# Patient Record
Sex: FEMALE | Race: AMERICAN INDIAN OR ALASKA NATIVE
[De-identification: names, ages, dates, MRNs, and addresses within clinical notes are randomized per-mention and may not be internally consistent; named-entity substitution may affect disease eponyms.]

---

## 2018-03-24 ENCOUNTER — HOSPITAL ENCOUNTER (INPATIENT)
Dept: HOSPITAL 5 - ED | Age: 33
LOS: 6 days | Discharge: HOME | DRG: 781 | End: 2018-03-30
Attending: OBSTETRICS & GYNECOLOGY | Admitting: OBSTETRICS & GYNECOLOGY
Payer: MEDICAID

## 2018-03-24 DIAGNOSIS — Z3A.01: ICD-10-CM

## 2018-03-24 DIAGNOSIS — O34.11: ICD-10-CM

## 2018-03-24 DIAGNOSIS — D25.9: ICD-10-CM

## 2018-03-24 DIAGNOSIS — O21.1: Primary | ICD-10-CM

## 2018-03-24 LAB
ALBUMIN SERPL-MCNC: 4.5 G/DL (ref 3.9–5)
ALT SERPL-CCNC: 17 UNITS/L (ref 7–56)
BACTERIA #/AREA URNS HPF: (no result) /HPF
BASOPHILS # (AUTO): 0 K/MM3 (ref 0–0.1)
BASOPHILS NFR BLD AUTO: 0.3 % (ref 0–1.8)
BILIRUB UR QL STRIP: (no result)
BLOOD UR QL VISUAL: (no result)
BUN SERPL-MCNC: 11 MG/DL (ref 7–17)
BUN/CREAT SERPL: 18 %
CALCIUM SERPL-MCNC: 9.6 MG/DL (ref 8.4–10.2)
EOSINOPHIL # BLD AUTO: 0 K/MM3 (ref 0–0.4)
EOSINOPHIL NFR BLD AUTO: 0.1 % (ref 0–4.3)
HCT VFR BLD CALC: 47.6 % (ref 30.3–42.9)
HEMOLYSIS INDEX: 2
HGB BLD-MCNC: 15.6 GM/DL (ref 10.1–14.3)
LIPASE SERPL-CCNC: 47 UNITS/L (ref 13–60)
LYMPHOCYTES # BLD AUTO: 2.7 K/MM3 (ref 1.2–5.4)
LYMPHOCYTES NFR BLD AUTO: 21.8 % (ref 13.4–35)
MCH RBC QN AUTO: 27 PG (ref 28–32)
MCHC RBC AUTO-ENTMCNC: 33 % (ref 30–34)
MCV RBC AUTO: 81 FL (ref 79–97)
MONOCYTES # (AUTO): 1.2 K/MM3 (ref 0–0.8)
MONOCYTES % (AUTO): 9.9 % (ref 0–7.3)
MUCOUS THREADS #/AREA URNS HPF: (no result) /HPF
PH UR STRIP: 6 [PH] (ref 5–7)
PLATELET # BLD: 363 K/MM3 (ref 140–440)
RBC # BLD AUTO: 5.89 M/MM3 (ref 3.65–5.03)
RBC #/AREA URNS HPF: 7 /HPF (ref 0–6)
UROBILINOGEN UR-MCNC: < 2 MG/DL (ref ?–2)
WBC #/AREA URNS HPF: 5 /HPF (ref 0–6)

## 2018-03-24 PROCEDURE — 80074 ACUTE HEPATITIS PANEL: CPT

## 2018-03-24 PROCEDURE — 96365 THER/PROPH/DIAG IV INF INIT: CPT

## 2018-03-24 PROCEDURE — 80048 BASIC METABOLIC PNL TOTAL CA: CPT

## 2018-03-24 PROCEDURE — 82150 ASSAY OF AMYLASE: CPT

## 2018-03-24 PROCEDURE — 76817 TRANSVAGINAL US OBSTETRIC: CPT

## 2018-03-24 PROCEDURE — 83735 ASSAY OF MAGNESIUM: CPT

## 2018-03-24 PROCEDURE — 36415 COLL VENOUS BLD VENIPUNCTURE: CPT

## 2018-03-24 PROCEDURE — 83690 ASSAY OF LIPASE: CPT

## 2018-03-24 PROCEDURE — 84443 ASSAY THYROID STIM HORMONE: CPT

## 2018-03-24 PROCEDURE — 96375 TX/PRO/DX INJ NEW DRUG ADDON: CPT

## 2018-03-24 PROCEDURE — 82010 KETONE BODYS QUAN: CPT

## 2018-03-24 PROCEDURE — 84703 CHORIONIC GONADOTROPIN ASSAY: CPT

## 2018-03-24 PROCEDURE — 84132 ASSAY OF SERUM POTASSIUM: CPT

## 2018-03-24 PROCEDURE — 84439 ASSAY OF FREE THYROXINE: CPT

## 2018-03-24 PROCEDURE — 84481 FREE ASSAY (FT-3): CPT

## 2018-03-24 PROCEDURE — 80053 COMPREHEN METABOLIC PANEL: CPT

## 2018-03-24 PROCEDURE — 85025 COMPLETE CBC W/AUTO DIFF WBC: CPT

## 2018-03-24 PROCEDURE — 96376 TX/PRO/DX INJ SAME DRUG ADON: CPT

## 2018-03-24 PROCEDURE — 81001 URINALYSIS AUTO W/SCOPE: CPT

## 2018-03-24 PROCEDURE — 76801 OB US < 14 WKS SINGLE FETUS: CPT

## 2018-03-24 RX ADMIN — DEXTROSE, SODIUM CHLORIDE, SODIUM LACTATE, POTASSIUM CHLORIDE, AND CALCIUM CHLORIDE SCH MLS/HR: 5; .6; .31; .03; .02 INJECTION, SOLUTION INTRAVENOUS at 21:46

## 2018-03-24 RX ADMIN — METOCLOPRAMIDE SCH MG: 5 INJECTION, SOLUTION INTRAMUSCULAR; INTRAVENOUS at 21:46

## 2018-03-24 RX ADMIN — PROMETHAZINE HYDROCHLORIDE SCH MG: 25 SUPPOSITORY RECTAL at 21:47

## 2018-03-24 NOTE — EMERGENCY DEPARTMENT REPORT
HPI





- General


Chief Complaint: Abdominal Pain


Time Seen by Provider: 18 15:05





- HPI


HPI: 


32-year-old  female presents to the emergency department with a 

complaint of nausea, vomiting and dehydration while pregnant.  The patient 

believes herself to be about 10 weeks pregnant.  She says that the symptoms of 

an going on for the past 2 weeks.  She says that she has had a history of this 

one time in the past with her first child.  With this pregnancy she is  

with 2 previous miscarriages.  Her OB/GYN is Dr. Daquan Haynes but she has not seen 

them regarding the symptoms.  No recent travel or sick contacts at home.  She 

says she is unable to keep down any liquids or solids and she feels like it 

gets stuck in the middle of her chest. No vaginal bleeding, dysuria. 








ED Past Medical Hx





- Past Medical History


Previous Medical History?: Yes


Additional medical history: fibroids on ovary





- Surgical History


Past Surgical History?: No





- Social History


Smoking Status: Never Smoker


Substance Use Type: None





ED Review of Systems


ROS: 


Stated complaint: ABDOMINAL PAIN


Other details as noted in HPI





Comment: All other systems reviewed and negative


Constitutional: denies: chills, fever


Eyes: denies: eye pain, eye discharge, vision change


ENT: denies: ear pain, throat pain


Respiratory: denies: cough, shortness of breath, wheezing


Cardiovascular: denies: palpitations, edema


Gastrointestinal: abdominal pain, nausea, vomiting


Genitourinary: denies: urgency, dysuria, discharge


Musculoskeletal: denies: back pain, joint swelling, arthralgia


Skin: denies: rash, lesions


Neurological: denies: headache, weakness, paresthesias





Physical Exam





- Physical Exam


Vital Signs: 


 Vital Signs











  18





  12:56


 


Temperature 99.3 F


 


Pulse Rate 105 H


 


Respiratory 18





Rate 


 


Blood Pressure 128/84


 


O2 Sat by Pulse 96





Oximetry 











Physical Exam: 





GENERAL: The patient is well-developed well-nourished.


HENT: Normocephalic.  Atraumatic.    Patient has moist mucous membranes.


EYES: Extraocular motions are intact.  Pupils equal reactive to light 

bilaterally.


NECK: Supple.  Trachea is midline.


CHEST/LUNGS: Clear to auscultation.  There is no respiratory distress noted.


HEART/CARDIOVASCULAR: Regular.  There is mild tachycardia.  There is no murmur.


ABDOMEN: Abdomen is soft, nontender.  Patient has normal bowel sounds.  There 

is no abdominal distention.


SKIN: Skin is warm and dry.


NEURO: The patient is awake, alert, and oriented.  The patient is cooperative.  

The patient has no focal neurologic deficits.  The patient has normal speech.


MUSCULOSKELETAL: There is no tenderness or deformity.  There is no limitation 

range of motion.  There is no evidence of acute injury.





ED Course


 Vital Signs











  18





  12:56


 


Temperature 99.3 F


 


Pulse Rate 105 H


 


Respiratory 18





Rate 


 


Blood Pressure 128/84


 


O2 Sat by Pulse 96





Oximetry 














ED Medical Decision Making





- Lab Data


Result diagrams: 


 18 13:04





 18 13:04





- Radiology Data


Radiology results: report reviewed





EXAM: US OB TRANSVAGINAL 





HISTORY: abd pain in pregnancy 





TECHNIQUE: Transabdominal OB ultrasound. 





PRIORS: None currently available. 





FINDINGS: 


Single intrauterine pregnancy dates 7.3 weeks by crown rump 


length and mean sac diameter. DELORIS equals 2018. 





Mean sac diameter measures 23.9 mm. Crown-rump length measures 


11.1 mm. 





Uterus: 8.4 x 5.4 x 7.3 cm. Left uterine fibroid measures 1.8 cm. 


Gestational sac, yolk sac, and fetal pole identified. No 


subchorionic bleed. Fetal heart tones equals 152 beats per 


minute. 





Right ovary: 2.1 x 2.1 x 2.7 cm. Within normal limits. Flow is 


present. 





Left Ovary: 2.5 x 1.4 x 1.9 cm. Within normal limits. Flow is 


present. 





Adnexal: Unremarkable. 





No free fluid. 





IMPRESSION: 


Single live intrauterine pregnancy. 











Transcribed By: TYM 


Dictated By: FARHAN LO MD 


Electronically Authenticated By: FARHAN LO MD 


Signed Date/Time: 18 1612 





- Medical Decision Making


Patient has been dealing with about 2 weeks of nausea, vomiting and dehydration 

while pregnant.  Labs show hypokalemia with potassium of 2.8, some 

hypochloremia and hyponatremia.  She got 2 different doses of Zofran, 2 L of IV 

fluid and continues to have vomiting.  This appears consistent with hyperemesis 

gravidarum.  Ultrasound shows live intrauterine pregnancy at about 7.5 weeks.  

I spoke with the patient's OB/GYN, Dr. Haynes, who graciously accepted the 

patient to his service and allowed for me to write some bridging orders.








- Differential Diagnosis


hyperemesis, pregnancy, food poisoning


Critical Care Time: No


Critical care attestation.: 


If time is entered above; I have spent that time in minutes in the direct care 

of this critically ill patient, excluding procedure time.








ED Disposition


Clinical Impression: 


 Hyperemesis gravidarum, Hypokalemia, Hyponatremia, Dehydration





Disposition:  OP ADMIT IP TO THIS HOSP


Is pt being admited?: Yes


Condition: Fair


Instructions:  Abdominal Pain (ED)


Referrals: 


BALA HAYNES MD [Primary Care Provider] - 3-5 Days


Time of Disposition: 18:42

## 2018-03-24 NOTE — ULTRASOUND REPORT
FINAL REPORT



EXAM:  US OB TRANSVAGINAL



HISTORY:  abd pain in pregnancy 



TECHNIQUE:  Transabdominal OB ultrasound.



PRIORS:  None currently available.



FINDINGS:  

Single intrauterine pregnancy dates 7.3 weeks by crown rump

length and mean sac diameter. DELORIS equals November 7, 2018. 



Mean sac diameter measures 23.9 mm. Crown-rump length measures

11.1 mm. 



Uterus: 8.4 x 5.4 x 7.3 cm. Left uterine fibroid measures 1.8 cm.

Gestational sac, yolk sac, and fetal pole identified. No

subchorionic bleed. Fetal heart tones equals 152 beats per

minute. 



Right ovary: 2.1 x 2.1 x 2.7 cm. Within normal limits. Flow is

present. 



Left Ovary: 2.5 x 1.4 x 1.9 cm. Within normal limits. Flow is

present. 



Adnexal: Unremarkable.



No free fluid.



IMPRESSION:  

Single live intrauterine pregnancy.

## 2018-03-24 NOTE — SHORT STAY SUMMARY
Short Stay Documentation


Date of service: 18


Narrative H&P: 





Pt is a 33yo BF  LMP EDC 18; EGA 7 3/7 weeks by u/s presents to the 

emergency department with a complaint of nausea, vomiting and dehydration while 

pregnant.  She says that the symptoms of an going on for the past 2 weeks.  She 

says that she has had a history of this one time in the past with her first 

child.  Her OB/GYN is Dr. Daquan Haynes but she has not seen them regarding the 

symptoms.  No recent travel or sick contacts at home.  She says she is unable 

to keep down any liquids or solids and she feels like it gets stuck in the 

middle of her chest. No vaginal bleeding, dysuria. 








- History


Principal diagnosis: Hyperemesis


H&P: obtained from office


Past Medical History: No medical history


Past Surgical History: No surgical history


Social history: no significant social history, single





- Allergies and Medications


Current Medications: 


 Allergies





No Known Allergies Allergy (Verified 18 12:56)


 





Active Medications





Sodium Chloride (Nacl 0.9% 1000 Ml)  1,000 mls @ 150 mls/hr IV AS DIRECT JOHN











- Physical exam


General appearance: no acute distress


Lungs: Clear to auscultation


Breasts: deferred


Heart: Regular rate


Gastrointestinal: normal


Female Genitourinary: deferred


Extremities: no ischemia


Neurological: Normal gait, Normal speech





- Hospital course


Hospital course: 





Over the course of 6 days, pt received IV hydration, and IV antiemetics and her 

nausea and vomiting resolved.  Her hypokalemia was also corrected, and now she 

is  tolerating a reg diet without nausea or vomiting and ready to go home.





- Disposition


Condition at discharge: Good


Disposition: DC-01 TO HOME OR SELFCARE





- Discharge Diagnoses


(1) 8 weeks gestation of pregnancy


Status: Acute   





(2) Dehydration


Status: Resolved   





(3) Hyperemesis gravidarum


Status: Resolved   





(4) Hypokalemia


Status: Resolved   





Short Stay Discharge Plan


Activity: no restrictions


Diet: regular


Follow up with: 


BALA HAYNES MD [Primary Care Provider] - 7 Days


DAE LOPEZ CNM [Advanced Practice Nurse] - 7 Days


Prescriptions: 


Ondansetron [Zofran ODT TAB] 4 mg PO Q4H PRN #30 tab.rapdis


 PRN Reason: Nausea And Vomiting

## 2018-03-24 NOTE — ULTRASOUND REPORT
FINAL REPORT



EXAM:  US OB &lt; = 14 WEEKS FETUS



HISTORY:  abd pain in pregnancy 



TECHNIQUE:  Ultrasound obstetrical transabdominal 



PRIORS:  Correlated with today's transvaginal exam



FINDINGS:  

Single live intrauterine gestation present crown-rump length 1.1

centimeters corresponding to estimated gestational age 7 weeks 2

days with estimated date of delivery November 7, 2018 



Ovaries demonstrate normal size and echogenicity 



Note is made of 1.8 x 1.4 x 1.5 centimeter intramural hypodense

focus within the uterus consistent with a fibroid 



IMPRESSION:  

Single live intrauterine gestation estimated at 7 weeks 2 days 



1.8 centimeter uterine fibroid noted

## 2018-03-25 LAB
BUN SERPL-MCNC: 7 MG/DL (ref 7–17)
BUN/CREAT SERPL: 12 %
CALCIUM SERPL-MCNC: 8.3 MG/DL (ref 8.4–10.2)
HEMOLYSIS INDEX: 0

## 2018-03-25 RX ADMIN — ONDANSETRON PRN MG: 2 INJECTION INTRAMUSCULAR; INTRAVENOUS at 17:10

## 2018-03-25 RX ADMIN — METOCLOPRAMIDE SCH MG: 5 INJECTION, SOLUTION INTRAMUSCULAR; INTRAVENOUS at 11:25

## 2018-03-25 RX ADMIN — SODIUM CHLORIDE SCH MLS/HR: 0.9 INJECTION, SOLUTION INTRAVENOUS at 22:18

## 2018-03-25 RX ADMIN — METOCLOPRAMIDE SCH MG: 5 INJECTION, SOLUTION INTRAMUSCULAR; INTRAVENOUS at 04:26

## 2018-03-25 RX ADMIN — POTASSIUM CHLORIDE SCH MLS/HR: 10 INJECTION, SOLUTION INTRAVENOUS at 15:09

## 2018-03-25 RX ADMIN — DEXTROSE, SODIUM CHLORIDE, SODIUM LACTATE, POTASSIUM CHLORIDE, AND CALCIUM CHLORIDE SCH MLS/HR: 5; .6; .31; .03; .02 INJECTION, SOLUTION INTRAVENOUS at 00:45

## 2018-03-25 RX ADMIN — PROMETHAZINE HYDROCHLORIDE SCH MG: 25 SUPPOSITORY RECTAL at 17:09

## 2018-03-25 RX ADMIN — POTASSIUM CHLORIDE SCH MLS/HR: 10 INJECTION, SOLUTION INTRAVENOUS at 22:18

## 2018-03-25 RX ADMIN — PROMETHAZINE HYDROCHLORIDE SCH MG: 25 SUPPOSITORY RECTAL at 11:19

## 2018-03-25 RX ADMIN — ONDANSETRON PRN MG: 2 INJECTION INTRAMUSCULAR; INTRAVENOUS at 11:24

## 2018-03-25 RX ADMIN — METOCLOPRAMIDE SCH MG: 5 INJECTION, SOLUTION INTRAMUSCULAR; INTRAVENOUS at 17:10

## 2018-03-25 RX ADMIN — PROMETHAZINE HYDROCHLORIDE SCH MG: 25 SUPPOSITORY RECTAL at 04:26

## 2018-03-25 RX ADMIN — Medication SCH EACH: at 11:20

## 2018-03-25 NOTE — PROGRESS NOTE
Assessment and Plan





- Patient Problems


(1) 7 weeks gestation of pregnancy


Onset Date: 03/25/18   Current Visit: Yes   Status: Acute   


Plan to address problem: 


A:  IUP @ 7 4/7 weeks


      Hyperemesis gravidarum


      Hypokalemia





 P:  Continue with IV hydration and antiemetics


      Replete K+








(2) Dehydration


Onset Date: 03/25/18   Current Visit: Yes   Status: Acute   





(3) Hyperemesis gravidarum


Onset Date: 03/25/18   Current Visit: Yes   Status: Acute   





(4) Hypokalemia


Onset Date: 03/25/18   Current Visit: Yes   Status: Acute   





Subjective





- Subjective


Date of service: 03/25/18


Principal diagnosis: IUP @ 7 4/7 weeks; Hyperemesis


Interval history: 





Pt states she is still nauseated, but not vomiting.


Patient reports: no new complaints, no loss of fluid, no vaginal bleeding





Objective





- Vital Signs


Vital Signs: 


 Vital Signs - 12hr











  03/25/18 03/25/18 03/25/18





  00:00 04:00 08:44


 


Temperature 98.4 F 98.4 F 98.6 F


 


Pulse Rate 97 H 96 H 80


 


Respiratory 20 20 18





Rate   


 


Blood Pressure 108/64 117/76 118/67





[Left]   


 


O2 Sat by Pulse   98





Oximetry   














- Exam


Abdomen: Present: normal appearance, soft





- Labs


Labs: 


 Abnormal Labs











  03/24/18 03/24/18 03/24/18





  13:04 13:04 15:15


 


WBC  12.2 H  


 


RBC  5.89 H  


 


Hgb  15.6 H  


 


Hct  47.6 H  


 


MCH  27 L  


 


Mono % (Auto)  9.9 H  


 


Weld #  1.2 H  


 


Seg Neutrophils #  8.3 H  


 


Sodium   130 L 


 


Potassium   2.8 L* 


 


Chloride   84.1 L 


 


Creatinine   0.6 L 


 


Glucose   114 H 


 


Total Protein   8.3 H 


 


TSH   


 


U Epithel Cells (Auto)    25.0 H














  03/24/18





  22:36


 


WBC 


 


RBC 


 


Hgb 


 


Hct 


 


MCH 


 


Mono % (Auto) 


 


Mono # 


 


Seg Neutrophils # 


 


Sodium 


 


Potassium 


 


Chloride 


 


Creatinine 


 


Glucose 


 


Total Protein 


 


TSH  0.190 L


 


U Epithel Cells (Auto) 








 Laboratory Results - last 24 hr











  03/24/18 03/24/18 03/24/18





  13:04 13:04 13:04


 


WBC  12.2 H  


 


RBC  5.89 H  


 


Hgb  15.6 H  


 


Hct  47.6 H  


 


MCV  81  


 


MCH  27 L  


 


MCHC  33  


 


RDW  13.7  


 


Plt Count  363  


 


Lymph % (Auto)  21.8  


 


Mono % (Auto)  9.9 H  


 


Eos % (Auto)  0.1  


 


Baso % (Auto)  0.3  


 


Lymph #  2.7  


 


Mono #  1.2 H  


 


Eos #  0.0  


 


Baso #  0.0  


 


Seg Neutrophils %  67.9  


 


Seg Neutrophils #  8.3 H  


 


Sodium   130 L 


 


Potassium   2.8 L* 


 


Chloride   84.1 L 


 


Carbon Dioxide   24 


 


Anion Gap   25 


 


BUN   11 


 


Creatinine   0.6 L 


 


Estimated GFR   > 60 


 


BUN/Creatinine Ratio   18 


 


Glucose   114 H 


 


Calcium   9.6 


 


Magnesium   


 


Total Bilirubin   0.80 


 


AST   19 


 


ALT   17 


 


Alkaline Phosphatase   104 


 


Total Protein   8.3 H 


 


Albumin   4.5 


 


Albumin/Globulin Ratio   1.2 


 


Amylase   


 


Lipase   47 


 


TSH   


 


HCG, Qual    Positive


 


Urine Color   


 


Urine Turbidity   


 


Urine pH   


 


Ur Specific Gravity   


 


Urine Protein   


 


Urine Glucose (UA)   


 


Urine Ketones   


 


Urine Blood   


 


Urine Nitrite   


 


Urine Bilirubin   


 


Urine Urobilinogen   


 


Ur Leukocyte Esterase   


 


Urine WBC (Auto)   


 


Urine RBC (Auto)   


 


U Epithel Cells (Auto)   


 


Urine Bacteria (Auto)   


 


Urine Mucus   


 


Hepatitis A IgM Ab   


 


Hep Bs Antigen   


 


Hep B Core IgM Ab   


 


Hepatitis C Antibody   














  03/24/18 03/24/18 03/24/18





  13:04 15:15 22:36


 


WBC   


 


RBC   


 


Hgb   


 


Hct   


 


MCV   


 


MCH   


 


MCHC   


 


RDW   


 


Plt Count   


 


Lymph % (Auto)   


 


Mono % (Auto)   


 


Eos % (Auto)   


 


Baso % (Auto)   


 


Lymph #   


 


Mono #   


 


Eos #   


 


Baso #   


 


Seg Neutrophils %   


 


Seg Neutrophils #   


 


Sodium   


 


Potassium   


 


Chloride   


 


Carbon Dioxide   


 


Anion Gap   


 


BUN   


 


Creatinine   


 


Estimated GFR   


 


BUN/Creatinine Ratio   


 


Glucose   


 


Calcium   


 


Magnesium  2.20  


 


Total Bilirubin   


 


AST   


 


ALT   


 


Alkaline Phosphatase   


 


Total Protein   


 


Albumin   


 


Albumin/Globulin Ratio   


 


Amylase    117


 


Lipase   


 


TSH   


 


HCG, Qual   


 


Urine Color   Yellow 


 


Urine Turbidity   Clear 


 


Urine pH   6.0 


 


Ur Specific Gravity   1.028 


 


Urine Protein   100 mg/dl 


 


Urine Glucose (UA)   50 


 


Urine Ketones   80 


 


Urine Blood   Neg 


 


Urine Nitrite   Neg 


 


Urine Bilirubin   Neg 


 


Urine Urobilinogen   < 2.0 


 


Ur Leukocyte Esterase   Tr 


 


Urine WBC (Auto)   5.0 


 


Urine RBC (Auto)   7.0 


 


U Epithel Cells (Auto)   25.0 H 


 


Urine Bacteria (Auto)   1+ 


 


Urine Mucus   3+ 


 


Hepatitis A IgM Ab   


 


Hep Bs Antigen   


 


Hep B Core IgM Ab   


 


Hepatitis C Antibody   














  03/24/18 03/24/18 03/25/18





  22:36 22:36 05:02


 


WBC   


 


RBC   


 


Hgb   


 


Hct   


 


MCV   


 


MCH   


 


MCHC   


 


RDW   


 


Plt Count   


 


Lymph % (Auto)   


 


Mono % (Auto)   


 


Eos % (Auto)   


 


Baso % (Auto)   


 


Lymph #   


 


Mono #   


 


Eos #   


 


Baso #   


 


Seg Neutrophils %   


 


Seg Neutrophils #   


 


Sodium   


 


Potassium   


 


Chloride   


 


Carbon Dioxide   


 


Anion Gap   


 


BUN   


 


Creatinine   


 


Estimated GFR   


 


BUN/Creatinine Ratio   


 


Glucose   


 


Calcium   


 


Magnesium   


 


Total Bilirubin   


 


AST   


 


ALT   


 


Alkaline Phosphatase   


 


Total Protein   


 


Albumin   


 


Albumin/Globulin Ratio   


 


Amylase   


 


Lipase   


 


TSH  0.190 L  


 


HCG, Qual   


 


Urine Color   


 


Urine Turbidity   


 


Urine pH   


 


Ur Specific Gravity   


 


Urine Protein   


 


Urine Glucose (UA)   


 


Urine Ketones    20


 


Urine Blood   


 


Urine Nitrite   


 


Urine Bilirubin   


 


Urine Urobilinogen   


 


Ur Leukocyte Esterase   


 


Urine WBC (Auto)   


 


Urine RBC (Auto)   


 


U Epithel Cells (Auto)   


 


Urine Bacteria (Auto)   


 


Urine Mucus   


 


Hepatitis A IgM Ab   Non-reactive 


 


Hep Bs Antigen   Non-reactive 


 


Hep B Core IgM Ab   Non-reactive 


 


Hepatitis C Antibody   Non-reactive

## 2018-03-26 RX ADMIN — POTASSIUM CHLORIDE SCH MLS/HR: 10 INJECTION, SOLUTION INTRAVENOUS at 00:01

## 2018-03-26 RX ADMIN — POTASSIUM CHLORIDE PRN MLS/HR: 10 INJECTION, SOLUTION INTRAVENOUS at 10:30

## 2018-03-26 RX ADMIN — METOCLOPRAMIDE SCH MG: 5 INJECTION, SOLUTION INTRAMUSCULAR; INTRAVENOUS at 18:06

## 2018-03-26 RX ADMIN — METOCLOPRAMIDE SCH MG: 5 INJECTION, SOLUTION INTRAMUSCULAR; INTRAVENOUS at 12:09

## 2018-03-26 RX ADMIN — METOCLOPRAMIDE SCH MG: 5 INJECTION, SOLUTION INTRAMUSCULAR; INTRAVENOUS at 00:03

## 2018-03-26 RX ADMIN — POTASSIUM CHLORIDE SCH MLS/HR: 10 INJECTION, SOLUTION INTRAVENOUS at 06:17

## 2018-03-26 RX ADMIN — PROMETHAZINE HYDROCHLORIDE SCH: 25 SUPPOSITORY RECTAL at 22:00

## 2018-03-26 RX ADMIN — POTASSIUM CHLORIDE SCH MLS/HR: 10 INJECTION, SOLUTION INTRAVENOUS at 06:18

## 2018-03-26 RX ADMIN — PROMETHAZINE HYDROCHLORIDE SCH MG: 25 SUPPOSITORY RECTAL at 00:02

## 2018-03-26 RX ADMIN — SODIUM CHLORIDE SCH MLS/HR: 0.9 INJECTION, SOLUTION INTRAVENOUS at 06:18

## 2018-03-26 RX ADMIN — PROMETHAZINE HYDROCHLORIDE SCH: 25 SUPPOSITORY RECTAL at 04:00

## 2018-03-26 RX ADMIN — POTASSIUM CHLORIDE SCH MLS/HR: 10 INJECTION, SOLUTION INTRAVENOUS at 00:02

## 2018-03-26 RX ADMIN — POTASSIUM CHLORIDE PRN MLS/HR: 10 INJECTION, SOLUTION INTRAVENOUS at 21:03

## 2018-03-26 RX ADMIN — POTASSIUM CHLORIDE SCH MLS/HR: 10 INJECTION, SOLUTION INTRAVENOUS at 09:13

## 2018-03-26 RX ADMIN — POTASSIUM CHLORIDE PRN MLS/HR: 10 INJECTION, SOLUTION INTRAVENOUS at 12:07

## 2018-03-26 RX ADMIN — POTASSIUM CHLORIDE PRN MLS/HR: 10 INJECTION, SOLUTION INTRAVENOUS at 22:13

## 2018-03-26 RX ADMIN — POTASSIUM CHLORIDE PRN MLS/HR: 10 INJECTION, SOLUTION INTRAVENOUS at 14:04

## 2018-03-26 RX ADMIN — SODIUM CHLORIDE SCH MLS/HR: 0.9 INJECTION, SOLUTION INTRAVENOUS at 14:03

## 2018-03-26 RX ADMIN — METOCLOPRAMIDE SCH MG: 5 INJECTION, SOLUTION INTRAMUSCULAR; INTRAVENOUS at 06:18

## 2018-03-26 RX ADMIN — Medication SCH: at 12:14

## 2018-03-26 RX ADMIN — SODIUM CHLORIDE SCH MLS/HR: 0.9 INJECTION, SOLUTION INTRAVENOUS at 21:11

## 2018-03-26 RX ADMIN — PROMETHAZINE HYDROCHLORIDE SCH MG: 25 SUPPOSITORY RECTAL at 12:14

## 2018-03-26 RX ADMIN — PROMETHAZINE HYDROCHLORIDE SCH MG: 25 SUPPOSITORY RECTAL at 18:07

## 2018-03-26 NOTE — CONSULTATION
History of Present Illness


Reason for consult: other (Pt is a 33yo BF  LMP EDC 18; EGA 7  

weeks by u/s presents to the emergency department with a complaint of nausea, 

vomiting and dehydration while pregnant.  She says that the symptoms of an 

going on for the past 3 weeks. Patient denies HG with her son .  Her OB/GYN is 

Dr. Daquan Haynes .  No recent travel or sick contacts at home.  She says she is 

unable to keep down any solids and she feels hungry .  Patient does report 

ptyalism  No vaginal bleeding, dysuria, temp.)





Medications and Allergies


 Allergies











Allergy/AdvReac Type Severity Reaction Status Date / Time


 


No Known Allergies Allergy   Verified 18 12:56











 Home Medications











 Medication  Instructions  Recorded  Confirmed  Last Taken  Type


 


No Known Home Medications [No  18 Unknown History





Reported Home Medications]     











Active Meds: 


Active Medications





Sodium Chloride (Nacl 0.9% 1000 Ml)  1,000 mls @ 150 mls/hr IV AS DIRECT [facility]


   Last Admin: 18 06:18 Dose:  150 mls/hr


Potassium Chloride (Kcl 10meq/100ml)  10 meq in 100 mls @ 100 mls/hr IV Q1H PRN


   PRN Reason: Potassium 3-3.5 mEq/L


   Stop: 18 23:59


   Last Admin: 18 10:30 Dose:  100 mls/hr


Metoclopramide HCl (Reglan)  10 mg IV Q6H [facility]


   Last Admin: 18 06:18 Dose:  10 mg


Multivitamins/Iron/Calcium (Prenatal Vitamin)  1 each PO QDAY [facility]


   Last Admin: 18 11:20 Dose:  1 each


Ondansetron HCl (Zofran)  4 mg IV Q6H PRN


   PRN Reason: N/V unrelieved by Reglan


   Last Admin: 18 17:10 Dose:  4 mg


Promethazine HCl (Phenergan)  25 mg MD Q6H [facility]


   Last Admin: 18 04:00 Dose:  Not Given











Review of Systems


Constitutional: weight loss, fatigue, no fever, no chills


Eyes: no blurred vision


Ears, nose, mouth and throat: no dysphagia, no headache


Cardiovascular: no chest pain, no palpitations, no rapid/irregular heart beat, 

no edema, no shortness of breath, no high blood pressure


Respiratory: no shortness of breath


Breasts: deferred


Gastrointestinal: nausea, other (ptyalism ), no abdominal pain, no vomiting, no 

diarrhea


Genitourinary: no vaginal bleeding, no vaginal discharge, no leakage of fluid, 

no pelvic pain


Integumentary: no rash


Neurological: no headaches


Psychiatric: no anxiety, no depression


Allergic/Immunologic: no wheezing





- Vital Signs


Vital signs: 


 Vital Signs











Temp Pulse Resp BP Pulse Ox


 


 99.3 F   105 H  18   128/84   96 


 


 18 12:56  18 12:56  18 12:56  18 12:56  18 12:56








 











Temp Pulse Resp BP Pulse Ox


 


 98.5 F   82   18   113/69   96 


 


 18 08:21  18 08:21  18 08:21  18 08:21  18 08:21














- Physical Exam


Breasts: Positive: deferred


Cardiovascular: Regular rate


Lungs: Positive: Normal air movement


Abdomen: Negative: tenderness, guarding


Uterus: Negative: tender


Deep Tendon Reflex Grade: Normal +2





- Obstetrical


FHR: other (+FHT per [facility] US 3/24/18)





Results


Result Diagrams: 


 18 13:04





 18 05:53


 Abnormal lab results











  18 Range/Units





  12:38 05:53 


 


Sodium  136 L   (137-145)  mmol/L


 


Potassium  2.5 L*  3.1 L D  (3.6-5.0)  mmol/L


 


Creatinine  0.6 L   (0.7-1.2)  mg/dL


 


Calcium  8.3 L   (8.4-10.2)  mg/dL








All other labs normal.





Ultrasound: report reviewed ( 3/24/18 [facility] OB US TV 7.3 weeks CR length 

measured 11.1 mm + FHT of 152 bpm Uterine fibroid )





Assessment and Plan





A) 





SIUP @ 7.5 weeks 


Hyperemisis Gravidarum times 3 weeks improved per patient  


Under medical regimen of IVF , Zofran , Reglan , and Phenergan 


Tolerating clear diet now desires to eat food


Hyokalemia currently under K+ replacement 


Small uterine fibroid noted 


Low TSH 








P) 





In agreement with inpatient management 


Obtain TSH, T#, and T4, Free 


Monitor ketones and electrolytes closely 


Document Amylase,lipase, and liver function assessment 


Patient desires to eat - Advance to soft diet as tolerated 


Khris K+ replacement until hypokalemia resolves 


With any concerns or questions call on call MD provider - Dr. Rudolph

## 2018-03-26 NOTE — PROGRESS NOTE
Assessment and Plan





- Patient Problems


(1) 7 weeks gestation of pregnancy


Onset Date: 03/25/18   Current Visit: Yes   Status: Acute   


Plan to address problem: 


A:  IUP @ 7 5/7 weeks


      Hyperemesis gravidarum - improving


      Hypokalemia - improving





 P:  Continue with IV hydration and antiemetics


      Replete K+


      Obtain [facility] consultation








(2) Dehydration


Onset Date: 03/25/18   Current Visit: Yes   Status: Acute   





(3) Hyperemesis gravidarum


Onset Date: 03/25/18   Current Visit: Yes   Status: Acute   





(4) Hypokalemia


Onset Date: 03/25/18   Current Visit: Yes   Status: Acute   





Subjective





- Subjective


Date of service: 03/26/18


Principal diagnosis: IUP @ 7 5/7 weeks; Hyperemesis


Interval history: 





Pt states she is still nauseated, but not vomiting. She states she is feeling 

better.


Patient reports: no new complaints, no loss of fluid, no vaginal bleeding





Objective





- Vital Signs


Vital Signs: 


 Vital Signs - 12hr











  03/25/18 03/26/18 03/26/18





  23:30 04:00 08:21


 


Temperature 98.6 F 98.7 F 98.5 F


 


Pulse Rate 66 77 82


 


Respiratory 16 16 18





Rate   


 


Blood Pressure   113/69


 


Blood Pressure 102/76 114/76 





[Right]   


 


O2 Sat by Pulse   96





Oximetry   














- Exam


Cardiovascular: Regular rate


Lungs: Clear to auscultation


Abdomen: Present: normal appearance





- Labs


Labs: 


 Abnormal Labs











  03/24/18 03/24/18 03/24/18





  13:04 13:04 15:15


 


WBC  12.2 H  


 


RBC  5.89 H  


 


Hgb  15.6 H  


 


Hct  47.6 H  


 


MCH  27 L  


 


Mono % (Auto)  9.9 H  


 


Dyer #  1.2 H  


 


Seg Neutrophils #  8.3 H  


 


Sodium   130 L 


 


Potassium   2.8 L* 


 


Chloride   84.1 L 


 


Creatinine   0.6 L 


 


Glucose   114 H 


 


Calcium   


 


Total Protein   8.3 H 


 


TSH   


 


U Epithel Cells (Auto)    25.0 H














  03/24/18 03/25/18 03/26/18





  22:36 12:38 05:53


 


WBC   


 


RBC   


 


Hgb   


 


Hct   


 


MCH   


 


Mono % (Auto)   


 


Mono #   


 


Seg Neutrophils #   


 


Sodium   136 L 


 


Potassium   2.5 L*  3.1 L D


 


Chloride   


 


Creatinine   0.6 L 


 


Glucose   


 


Calcium   8.3 L 


 


Total Protein   


 


TSH  0.190 L  


 


U Epithel Cells (Auto)   








 Laboratory Results - last 24 hr











  03/25/18 03/26/18 03/26/18





  12:38 03:10 05:53


 


Sodium  136 L  


 


Potassium  2.5 L*   3.1 L D


 


Chloride  99.0  


 


Carbon Dioxide  27  


 


Anion Gap  13  


 


BUN  7  


 


Creatinine  0.6 L  


 


Estimated GFR  > 60  


 


BUN/Creatinine Ratio  12  


 


Glucose  91  


 


Calcium  8.3 L  


 


Urine Ketones   Neg

## 2018-03-27 RX ADMIN — METOCLOPRAMIDE SCH MG: 5 INJECTION, SOLUTION INTRAMUSCULAR; INTRAVENOUS at 00:22

## 2018-03-27 RX ADMIN — SODIUM CHLORIDE SCH MLS/HR: 0.9 INJECTION, SOLUTION INTRAVENOUS at 07:28

## 2018-03-27 RX ADMIN — POTASSIUM CHLORIDE PRN MLS/HR: 10 INJECTION, SOLUTION INTRAVENOUS at 16:10

## 2018-03-27 RX ADMIN — POTASSIUM CHLORIDE PRN MLS/HR: 10 INJECTION, SOLUTION INTRAVENOUS at 00:23

## 2018-03-27 RX ADMIN — POTASSIUM CHLORIDE PRN MLS/HR: 10 INJECTION, SOLUTION INTRAVENOUS at 20:09

## 2018-03-27 RX ADMIN — POTASSIUM CHLORIDE PRN MLS/HR: 10 INJECTION, SOLUTION INTRAVENOUS at 00:19

## 2018-03-27 RX ADMIN — SODIUM CHLORIDE SCH MLS/HR: 0.9 INJECTION, SOLUTION INTRAVENOUS at 13:59

## 2018-03-27 RX ADMIN — SODIUM CHLORIDE SCH MLS/HR: 0.9 INJECTION, SOLUTION INTRAVENOUS at 20:13

## 2018-03-27 RX ADMIN — METOCLOPRAMIDE SCH: 5 INJECTION, SOLUTION INTRAMUSCULAR; INTRAVENOUS at 04:00

## 2018-03-27 RX ADMIN — POTASSIUM CHLORIDE PRN MLS/HR: 10 INJECTION, SOLUTION INTRAVENOUS at 11:30

## 2018-03-27 RX ADMIN — ONDANSETRON PRN MG: 2 INJECTION INTRAMUSCULAR; INTRAVENOUS at 05:27

## 2018-03-27 RX ADMIN — POTASSIUM CHLORIDE PRN MLS/HR: 10 INJECTION, SOLUTION INTRAVENOUS at 13:55

## 2018-03-27 NOTE — PROGRESS NOTE
Assessment and Plan





- Patient Problems


(1) 7 weeks gestation of pregnancy


Onset Date: 03/25/18   Current Visit: Yes   Status: Acute   


Plan to address problem: 


A:  IUP @ 7 6/7 weeks


      Hyperemesis gravidarum - improving


      Hypokalemia - improving





 P:  Continue with IV hydration and antiemetics


      Replete K+


      Appreciate [facility] consultation








(2) Dehydration


Onset Date: 03/25/18   Current Visit: Yes   Status: Acute   





(3) Hyperemesis gravidarum


Onset Date: 03/25/18   Current Visit: Yes   Status: Acute   





(4) Hypokalemia


Onset Date: 03/25/18   Current Visit: Yes   Status: Acute   





Subjective





- Subjective


Date of service: 03/27/18


Principal diagnosis: IUP @ 7 6/7 weeks; Hyperemesis


Interval history: 





Pt states she is still nauseated, tolerating a liquid diet She states she is 

feeling better.


Patient reports: no new complaints, no loss of fluid, no vaginal bleeding





Objective





- Vital Signs


Vital Signs: 


 Vital Signs - 12hr











  03/27/18 03/27/18 03/27/18





  01:03 05:12 07:32


 


Temperature 99.4 F 99.1 F 98.6 F


 


Pulse Rate 102 H 101 H 80


 


Respiratory 22 20 18





Rate   


 


Blood Pressure 113/70  108/64


 


Blood Pressure  114/72 





[Right]   


 


O2 Sat by Pulse 97  99





Oximetry   














- Exam


Abdomen: Present: normal appearance, soft





- Labs


Labs: 


 Abnormal Labs











  03/24/18 03/24/18 03/24/18





  13:04 13:04 15:15


 


WBC  12.2 H  


 


RBC  5.89 H  


 


Hgb  15.6 H  


 


Hct  47.6 H  


 


MCH  27 L  


 


Mono % (Auto)  9.9 H  


 


[place] #  1.2 H  


 


Seg Neutrophils #  8.3 H  


 


Sodium   130 L 


 


Potassium   2.8 L* 


 


Chloride   84.1 L 


 


Creatinine   0.6 L 


 


Glucose   114 H 


 


Calcium   


 


Total Protein   8.3 H 


 


TSH   


 


Free T4   


 


U Epithel Cells (Auto)    25.0 H














  03/24/18 03/25/18 03/26/18





  22:36 12:38 05:53


 


WBC   


 


RBC   


 


Hgb   


 


Hct   


 


MCH   


 


Mono % (Auto)   


 


Mono #   


 


Seg Neutrophils #   


 


Sodium   136 L 


 


Potassium   2.5 L*  3.1 L D


 


Chloride   


 


Creatinine   0.6 L 


 


Glucose   


 


Calcium   8.3 L 


 


Total Protein   


 


TSH  0.190 L  


 


Free T4   


 


U Epithel Cells (Auto)   














  03/26/18 03/27/18 03/27/18





  16:40 05:26 09:53


 


WBC   


 


RBC   


 


Hgb   


 


Hct   


 


MCH   


 


Mono % (Auto)   


 


Mono #   


 


Seg Neutrophils #   


 


Sodium   


 


Potassium  3.1 L  3.2 L 


 


Chloride   


 


Creatinine   


 


Glucose   


 


Calcium   


 


Total Protein   


 


TSH   


 


Free T4    1.67 H


 


U Epithel Cells (Auto)   








 Laboratory Results - last 24 hr











  03/26/18 03/26/18 03/27/18





  16:00 16:40 05:26


 


Potassium   3.1 L  3.2 L


 


Free T4   


 


Urine Ketones  Neg  














  03/27/18





  09:53


 


Potassium 


 


Free T4  1.67 H


 


Urine Ketones

## 2018-03-28 RX ADMIN — SODIUM CHLORIDE SCH MLS/HR: 0.9 INJECTION, SOLUTION INTRAVENOUS at 22:59

## 2018-03-28 RX ADMIN — ONDANSETRON PRN MG: 4 TABLET, ORALLY DISINTEGRATING ORAL at 22:48

## 2018-03-28 RX ADMIN — SODIUM CHLORIDE SCH MLS/HR: 0.9 INJECTION, SOLUTION INTRAVENOUS at 03:57

## 2018-03-28 NOTE — PROGRESS NOTE
Assessment and Plan





- Patient Problems


(1) 7 weeks gestation of pregnancy


Onset Date: 03/25/18   Current Visit: Yes   Status: Acute   


Plan to address problem: 


A:  IUP @ 8 0/7 weeks


      Hyperemesis gravidarum - improving


      Hypokalemia - resolved





 P:  Anticipate discharge to home later today








(2) Dehydration


Onset Date: 03/25/18   Current Visit: Yes   Status: Acute   





(3) Hyperemesis gravidarum


Onset Date: 03/25/18   Current Visit: Yes   Status: Acute   





(4) Hypokalemia


Onset Date: 03/25/18   Current Visit: Yes   Status: Acute   





Subjective





- Subjective


Date of service: 03/28/18


Principal diagnosis: IUP @ 8 0/7 weeks; Hyperemesis


Interval history: 





Pt states she is feeling well, tolerating a reg diet without nausea or 

vomiting. She states she is feeling better.


Patient reports: no new complaints, no loss of fluid, no vaginal bleeding





Objective





- Vital Signs


Vital Signs: 


 Vital Signs - 12hr











  03/28/18 03/28/18





  00:00 04:25


 


Temperature 98.2 F 98.2 F


 


Pulse Rate 83 80


 


Respiratory  20





Rate  


 


Blood Pressure 113/74 108/66





[Right]  














- Exam


Abdomen: Present: normal appearance, soft





- Labs


Labs: 


 Abnormal Labs











  03/24/18 03/24/18 03/24/18





  13:04 13:04 15:15


 


WBC  12.2 H  


 


RBC  5.89 H  


 


Hgb  15.6 H  


 


Hct  47.6 H  


 


MCH  27 L  


 


Mono % (Auto)  9.9 H  


 


[place] #  1.2 H  


 


Seg Neutrophils #  8.3 H  


 


Sodium   130 L 


 


Potassium   2.8 L* 


 


Chloride   84.1 L 


 


Creatinine   0.6 L 


 


Glucose   114 H 


 


Calcium   


 


Total Protein   8.3 H 


 


TSH   


 


Free T4   


 


U Epithel Cells (Auto)    25.0 H














  03/24/18 03/25/18 03/26/18





  22:36 12:38 05:53


 


WBC   


 


RBC   


 


Hgb   


 


Hct   


 


MCH   


 


Mono % (Auto)   


 


Mono #   


 


Seg Neutrophils #   


 


Sodium   136 L 


 


Potassium   2.5 L*  3.1 L D


 


Chloride   


 


Creatinine   0.6 L 


 


Glucose   


 


Calcium   8.3 L 


 


Total Protein   


 


TSH  0.190 L  


 


Free T4   


 


U Epithel Cells (Auto)   














  03/26/18 03/27/18 03/27/18





  16:40 05:26 09:53


 


WBC   


 


RBC   


 


Hgb   


 


Hct   


 


MCH   


 


Mono % (Auto)   


 


Mono #   


 


Seg Neutrophils #   


 


Sodium   


 


Potassium  3.1 L  3.2 L 


 


Chloride   


 


Creatinine   


 


Glucose   


 


Calcium   


 


Total Protein   


 


TSH   


 


Free T4    1.67 H


 


U Epithel Cells (Auto)   








 Laboratory Results - last 24 hr











  03/27/18 03/27/18





  09:53 22:47


 


Potassium   3.9  D


 


Free T4  1.67 H

## 2018-03-29 RX ADMIN — SODIUM CHLORIDE SCH MLS/HR: 0.9 INJECTION, SOLUTION INTRAVENOUS at 05:50

## 2018-03-29 RX ADMIN — ONDANSETRON PRN MG: 4 TABLET, ORALLY DISINTEGRATING ORAL at 15:28

## 2018-03-29 RX ADMIN — ONDANSETRON PRN MG: 4 TABLET, ORALLY DISINTEGRATING ORAL at 21:57

## 2018-03-29 RX ADMIN — ONDANSETRON PRN MG: 4 TABLET, ORALLY DISINTEGRATING ORAL at 04:38

## 2018-03-29 NOTE — PROGRESS NOTE
Assessment and Plan





- Patient Problems


(1) 7 weeks gestation of pregnancy


Onset Date: 03/25/18   Current Visit: Yes   Status: Acute   


Plan to address problem: 


A:  IUP @ 8 1/7 weeks


      Hyperemesis gravidarum - improving


      Hypokalemia - resolved





 P:  Anticipate discharge to home tomorrow.








(2) Dehydration


Onset Date: 03/25/18   Current Visit: Yes   Status: Acute   





(3) Hyperemesis gravidarum


Onset Date: 03/25/18   Current Visit: Yes   Status: Acute   





(4) Hypokalemia


Onset Date: 03/25/18   Current Visit: Yes   Status: Acute   





Subjective





- Subjective


Date of service: 03/29/18


Principal diagnosis: IUP @ 8 1/7 weeks; Hyperemesis


Interval history: 





Pt states she is feeling well, had some vomiting earlier, now resolved and 

tolerating a reg diet without nausea or vomiting. She states she is feeling 

better.


Patient reports: no new complaints, no loss of fluid, no vaginal bleeding





Objective





- Vital Signs


Vital Signs: 


 Vital Signs - 12hr











  03/29/18 03/29/18 03/29/18





  00:00 04:20 07:25


 


Temperature 98.2 F 98.2 F 98.8 F


 


Pulse Rate 77 78 78


 


Respiratory 18 18 18





Rate   


 


Blood Pressure   109/64


 


Blood Pressure 111/62 113/71 





[Right]   


 


O2 Sat by Pulse   98





Oximetry   














- Exam


Cardiovascular: Regular rate


Lungs: Clear to auscultation


Abdomen: Present: normal appearance, soft





- Labs


Labs: 


 Abnormal Labs











  03/24/18 03/24/18 03/24/18





  13:04 13:04 15:15


 


WBC  12.2 H  


 


RBC  5.89 H  


 


Hgb  15.6 H  


 


Hct  47.6 H  


 


MCH  27 L  


 


Mono % (Auto)  9.9 H  


 


[place] #  1.2 H  


 


Seg Neutrophils #  8.3 H  


 


Sodium   130 L 


 


Potassium   2.8 L* 


 


Chloride   84.1 L 


 


Creatinine   0.6 L 


 


Glucose   114 H 


 


Calcium   


 


Total Protein   8.3 H 


 


TSH   


 


Free T4   


 


U Epithel Cells (Auto)    25.0 H














  03/24/18 03/25/18 03/26/18





  22:36 12:38 05:53


 


WBC   


 


RBC   


 


Hgb   


 


Hct   


 


MCH   


 


Mono % (Auto)   


 


Mono #   


 


Seg Neutrophils #   


 


Sodium   136 L 


 


Potassium   2.5 L*  3.1 L D


 


Chloride   


 


Creatinine   0.6 L 


 


Glucose   


 


Calcium   8.3 L 


 


Total Protein   


 


TSH  0.190 L  


 


Free T4   


 


U Epithel Cells (Auto)   














  03/26/18 03/27/18 03/27/18





  16:40 05:26 09:53


 


WBC   


 


RBC   


 


Hgb   


 


Hct   


 


MCH   


 


Mono % (Auto)   


 


Mono #   


 


Seg Neutrophils #   


 


Sodium   


 


Potassium  3.1 L  3.2 L 


 


Chloride   


 


Creatinine   


 


Glucose   


 


Calcium   


 


Total Protein   


 


TSH   


 


Free T4    1.67 H


 


U Epithel Cells (Auto)   








 Laboratory Results - last 24 hr











  03/27/18





  09:53


 


Free T3 Index  2.8

## 2018-03-30 VITALS — DIASTOLIC BLOOD PRESSURE: 66 MMHG | SYSTOLIC BLOOD PRESSURE: 120 MMHG

## 2018-03-30 RX ADMIN — ONDANSETRON PRN MG: 4 TABLET, ORALLY DISINTEGRATING ORAL at 08:25

## 2018-03-30 NOTE — PROGRESS NOTE
Assessment and Plan





- Patient Problems


(1) 7 weeks gestation of pregnancy


Onset Date: 03/25/18   Current Visit: Yes   Status: Acute   


Plan to address problem: 


A:  IUP @ 8 2/7 weeks


      Hyperemesis gravidarum - resolved


      Hypokalemia - resolved





 P:  May go home today.








(2) Dehydration


Onset Date: 03/25/18   Current Visit: Yes   Status: Acute   





(3) Hyperemesis gravidarum


Onset Date: 03/25/18   Current Visit: Yes   Status: Acute   





(4) Hypokalemia


Onset Date: 03/25/18   Current Visit: Yes   Status: Acute   





Subjective





- Subjective


Date of service: 03/30/18


Principal diagnosis: IUP @ 8 2/7 weeks; Hyperemesis


Interval history: 





Pt is feeling well, tolerating a reg diet without nausea or vomiting. She 

states she is ready to go home.


Patient reports: no new complaints, no loss of fluid, no vaginal bleeding





Objective





- Vital Signs


Vital Signs: 


 Vital Signs - 12hr











  03/29/18 03/30/18 03/30/18





  21:15 00:00 04:20


 


Temperature 97.8 F 98.5 F 98.0 F


 


Pulse Rate 78 72 83


 


Respiratory 18 18 18





Rate   


 


Blood Pressure 111/61 116/73 109/58





[Right]   














- Exam


Cardiovascular: Regular rate


Lungs: Clear to auscultation


Abdomen: Present: normal appearance, soft





- Labs


Labs: 


 Abnormal Labs











  03/24/18 03/24/18 03/24/18





  13:04 13:04 15:15


 


WBC  12.2 H  


 


RBC  5.89 H  


 


Hgb  15.6 H  


 


Hct  47.6 H  


 


MCH  27 L  


 


Mono % (Auto)  9.9 H  


 


[place] #  1.2 H  


 


Seg Neutrophils #  8.3 H  


 


Sodium   130 L 


 


Potassium   2.8 L* 


 


Chloride   84.1 L 


 


Creatinine   0.6 L 


 


Glucose   114 H 


 


Calcium   


 


Total Protein   8.3 H 


 


TSH   


 


Free T4   


 


U Epithel Cells (Auto)    25.0 H














  03/24/18 03/25/18 03/26/18





  22:36 12:38 05:53


 


WBC   


 


RBC   


 


Hgb   


 


Hct   


 


MCH   


 


Mono % (Auto)   


 


Mono #   


 


Seg Neutrophils #   


 


Sodium   136 L 


 


Potassium   2.5 L*  3.1 L D


 


Chloride   


 


Creatinine   0.6 L 


 


Glucose   


 


Calcium   8.3 L 


 


Total Protein   


 


TSH  0.190 L  


 


Free T4   


 


U Epithel Cells (Auto)   














  03/26/18 03/27/18 03/27/18





  16:40 05:26 09:53


 


WBC   


 


RBC   


 


Hgb   


 


Hct   


 


MCH   


 


Mono % (Auto)   


 


Mono #   


 


Seg Neutrophils #   


 


Sodium   


 


Potassium  3.1 L  3.2 L 


 


Chloride   


 


Creatinine   


 


Glucose   


 


Calcium   


 


Total Protein   


 


TSH   


 


Free T4    1.67 H


 


U Epithel Cells (Auto)

## 2018-04-03 ENCOUNTER — HOSPITAL ENCOUNTER (EMERGENCY)
Dept: HOSPITAL 5 - ED | Age: 33
Discharge: HOME | End: 2018-04-03
Payer: MEDICAID

## 2018-04-03 VITALS — DIASTOLIC BLOOD PRESSURE: 79 MMHG | SYSTOLIC BLOOD PRESSURE: 110 MMHG

## 2018-04-03 DIAGNOSIS — O23.11: Primary | ICD-10-CM

## 2018-04-03 DIAGNOSIS — Z3A.10: ICD-10-CM

## 2018-04-03 DIAGNOSIS — O21.9: ICD-10-CM

## 2018-04-03 LAB
ALBUMIN SERPL-MCNC: 4.4 G/DL (ref 3.9–5)
ALT SERPL-CCNC: 11 UNITS/L (ref 7–56)
BASOPHILS # (AUTO): 0 K/MM3 (ref 0–0.1)
BASOPHILS NFR BLD AUTO: 0.5 % (ref 0–1.8)
BILIRUB UR QL STRIP: (no result)
BLOOD UR QL VISUAL: (no result)
BUN SERPL-MCNC: 6 MG/DL (ref 7–17)
BUN/CREAT SERPL: 10 %
CALCIUM SERPL-MCNC: 9.6 MG/DL (ref 8.4–10.2)
EOSINOPHIL # BLD AUTO: 0 K/MM3 (ref 0–0.4)
EOSINOPHIL NFR BLD AUTO: 0.3 % (ref 0–4.3)
HCT VFR BLD CALC: 42.4 % (ref 30.3–42.9)
HEMOLYSIS INDEX: 1
HGB BLD-MCNC: 14 GM/DL (ref 10.1–14.3)
LYMPHOCYTES # BLD AUTO: 2.2 K/MM3 (ref 1.2–5.4)
LYMPHOCYTES NFR BLD AUTO: 27.1 % (ref 13.4–35)
MCH RBC QN AUTO: 27 PG (ref 28–32)
MCHC RBC AUTO-ENTMCNC: 33 % (ref 30–34)
MCV RBC AUTO: 81 FL (ref 79–97)
MONOCYTES # (AUTO): 0.7 K/MM3 (ref 0–0.8)
MONOCYTES % (AUTO): 8.5 % (ref 0–7.3)
MUCOUS THREADS #/AREA URNS HPF: (no result) /HPF
PH UR STRIP: 6 [PH] (ref 5–7)
PLATELET # BLD: 362 K/MM3 (ref 140–440)
RBC # BLD AUTO: 5.23 M/MM3 (ref 3.65–5.03)
RBC #/AREA URNS HPF: 4 /HPF (ref 0–6)
UROBILINOGEN UR-MCNC: < 2 MG/DL (ref ?–2)
WBC #/AREA URNS HPF: 5 /HPF (ref 0–6)

## 2018-04-03 PROCEDURE — 84702 CHORIONIC GONADOTROPIN TEST: CPT

## 2018-04-03 PROCEDURE — 85025 COMPLETE CBC W/AUTO DIFF WBC: CPT

## 2018-04-03 PROCEDURE — 83690 ASSAY OF LIPASE: CPT

## 2018-04-03 PROCEDURE — 99283 EMERGENCY DEPT VISIT LOW MDM: CPT

## 2018-04-03 PROCEDURE — 36415 COLL VENOUS BLD VENIPUNCTURE: CPT

## 2018-04-03 PROCEDURE — 96375 TX/PRO/DX INJ NEW DRUG ADDON: CPT

## 2018-04-03 PROCEDURE — 80053 COMPREHEN METABOLIC PANEL: CPT

## 2018-04-03 PROCEDURE — 96365 THER/PROPH/DIAG IV INF INIT: CPT

## 2018-04-03 PROCEDURE — 96361 HYDRATE IV INFUSION ADD-ON: CPT

## 2018-04-03 PROCEDURE — 81001 URINALYSIS AUTO W/SCOPE: CPT

## 2018-04-03 NOTE — EMERGENCY DEPARTMENT REPORT
HPI





- General


Chief Complaint: Nausea/Vomiting/Diarrhea


Time Seen by Provider: 18 17:19





- HPI


HPI: 





Patient is a 32-year-old  who presents to ED at approximately 10 weeks' 

gestation complaining of controllable nausea or vomiting since Saturday.  

Patient states she is unable to hold food down and keep starring up.  Patient 

states she should take Zofran and Zofran does not help with her nausea and 

makes her feel more nauseous.  Patient states that she was seen by her doctor 

yesterday.  She denies fever, pelvic pain, vaginal bleeding or discharge





ED Past Medical Hx





- Past Medical History


Additional medical history: fibroids on ovary





- Social History


Smoking Status: Never Smoker


Substance Use Type: None





- Medications


Home Medications: 


 Home Medications











 Medication  Instructions  Recorded  Confirmed  Last Taken  Type


 


Ondansetron [Zofran ODT TAB] 4 mg PO Q4H PRN #30 tab.samanta 18  Unknown Rx


 


Doxylamine Succinate/Vit B6 2 tab PO QHS #60 tablet. 18  Unknown Rx





[Diclegis Dr 10-10 mg Tablet]     














ED Review of Systems


ROS: 


Stated complaint: ABD PN 10 WKS PREGNANT


Other details as noted in HPI





Constitutional: denies: chills, fever


Eyes: denies: eye pain, eye discharge, vision change


ENT: denies: ear pain, throat pain


Respiratory: denies: cough, shortness of breath, wheezing


Cardiovascular: denies: chest pain, palpitations


Endocrine: no symptoms reported


Gastrointestinal: denies: abdominal pain, nausea, diarrhea


Genitourinary: denies: urgency, dysuria, discharge


Musculoskeletal: denies: back pain, joint swelling, arthralgia


Skin: denies: rash, lesions


Neurological: denies: headache, weakness, paresthesias


Psychiatric: denies: anxiety, depression


Hematological/Lymphatic: denies: easy bleeding, easy bruising





Physical Exam





- Physical Exam


Vital Signs: 


 Vital Signs











  18





  14:12


 


Temperature 97.7 F


 


Pulse Rate 79


 


Respiratory 18





Rate 


 


Blood Pressure 110/79


 


O2 Sat by Pulse 100





Oximetry 











Physical Exam: 





GENERAL: Alert and oriented x3, no apparent distress, Normal Gait, atraumatic.





MOUTH:Mouth is well hydrated and without lesions. Tonsils nonerythematous or 

swollen,  Uvula midline, Tongue not elevated. Mucous membranes are moist. 

Posterior pharynx clear, no exudate or lesions. Patent airways.





LUNGS: Symetrical with respiration, No wheezing, no rales or crackles, CTAB.


HEART:  S1, S2 present, regular rate and rhythm without murmur, no rubs, no 

gallops. Non tender to palpation


ABDOMEN: No organomegaly was noted,Positive bowel sounds, soft, and non-

distended.  . Nontender to palpation on all Quadrants, NO CVA tenderness.


BACK: Full range of motion, no spinal tenderness, nontender to palpation.





SKIN:  Warm and dry, No lesions, No ulceration or induration present.











ED Course


 Vital Signs











  18





  14:12


 


Temperature 97.7 F


 


Pulse Rate 79


 


Respiratory 18





Rate 


 


Blood Pressure 110/79


 


O2 Sat by Pulse 100





Oximetry 














ED Medical Decision Making





- Lab Data


Result diagrams: 


 18 17:33





 18 17:33





 Laboratory Last Values











WBC  8.0 K/mm3 (4.5-11.0)   18  17:33    


 


RBC  5.23 M/mm3 (3.65-5.03)  H  18  17:33    


 


Hgb  14.0 gm/dl (10.1-14.3)   18  17:33    


 


Hct  42.4 % (30.3-42.9)   18  17:33    


 


MCV  81 fl (79-97)   18  17:33    


 


MCH  27 pg (28-32)  L  18  17:33    


 


MCHC  33 % (30-34)   18  17:33    


 


RDW  14.4 % (13.2-15.2)   18  17:33    


 


Plt Count  362 K/mm3 (140-440)   18  17:33    


 


Lymph % (Auto)  27.1 % (13.4-35.0)   18  17:33    


 


Mono % (Auto)  8.5 % (0.0-7.3)  H  18  17:33    


 


Eos % (Auto)  0.3 % (0.0-4.3)   18  17:33    


 


Baso % (Auto)  0.5 % (0.0-1.8)   18  17:33    


 


Lymph #  2.2 K/mm3 (1.2-5.4)   18  17:33    


 


Mono #  0.7 K/mm3 (0.0-0.8)   18  17:33    


 


Eos #  0.0 K/mm3 (0.0-0.4)   18  17:33    


 


Baso #  0.0 K/mm3 (0.0-0.1)   18  17:33    


 


Seg Neutrophils %  63.6 % (40.0-70.0)   18  17:33    


 


Seg Neutrophils #  5.1 K/mm3 (1.8-7.7)   18  17:33    


 


Sodium  137 mmol/L (137-145)   18  17:33    


 


Potassium  3.8 mmol/L (3.6-5.0)   18  17:33    


 


Chloride  98.0 mmol/L ()   18  17:33    


 


Carbon Dioxide  22 mmol/L (22-30)   18  17:33    


 


Anion Gap  21 mmol/L  18  17:33    


 


BUN  6 mg/dL (7-17)  L  18  17:33    


 


Creatinine  0.6 mg/dL (0.7-1.2)  L  18  17:33    


 


Estimated GFR  > 60 ml/min  18  17:33    


 


BUN/Creatinine Ratio  10 %  18  17:33    


 


Glucose  95 mg/dL ()   18  17:33    


 


Calcium  9.6 mg/dL (8.4-10.2)   18  17:33    


 


Total Bilirubin  0.40 mg/dL (0.1-1.2)   18  17:33    


 


AST  15 units/L (5-40)   18  17:33    


 


ALT  11 units/L (7-56)   18  17:33    


 


Alkaline Phosphatase  77 units/L ()   18  17:33    


 


Total Protein  7.7 g/dL (6.3-8.2)   18  17:33    


 


Albumin  4.4 g/dL (3.9-5)   18  17:33    


 


Albumin/Globulin Ratio  1.3 %  18  17:33    


 


Lipase  29 units/L (13-60)   18  17:33    


 


HCG, Quant  214733 mIU/mL (0-4)  H  18  17:33    


 


Urine Color  Yellow  (Yellow)   18  17:32    


 


Urine Turbidity  Clear  (Clear)   18  17:32    


 


Urine pH  6.0  (5.0-7.0)   18  17:32    


 


Ur Specific Gravity  1.023  (1.003-1.030)   18  17:32    


 


Urine Protein  30 mg/dl mg/dL (Negative)   18  17:32    


 


Urine Glucose (UA)  Neg mg/dL (Negative)   18  17:32    


 


Urine Ketones  80 mg/dL (Negative)   18  17:32    


 


Urine Blood  Neg  (Negative)   18  17:32    


 


Urine Nitrite  Neg  (Negative)   18  17:32    


 


Urine Bilirubin  Neg  (Negative)   18  17:32    


 


Urine Urobilinogen  < 2.0 mg/dL (<2.0)   18  17:32    


 


Ur Leukocyte Esterase  Lg  (Negative)   18  17:32    


 


Urine WBC (Auto)  5.0 /HPF (0.0-6.0)   18  17:32    


 


Urine RBC (Auto)  4.0 /HPF (0.0-6.0)   18  17:32    


 


U Epithel Cells (Auto)  16.0 /HPF (0-13.0)  H  18  17:32    


 


Urine Mucus  3+ /HPF  18  17:32    














- Medical Decision Making


32-year-old female presents with nausea vomiting and pregnancy


ED course: Patient received 1 normal saline.  1 L of D5 as well as Zofran and 

Rocephin ED


CBC within normal limit BMP shows low BUN and creatinine, normal quant level, 

mild cystitis according to urinalysis


I discussed the patient I will send her home on a trial of shelli [place] to 

help with her nausea and vomiting.


Discussed the patient to try gracie products gracie teas, gracie powders to put 

it foods and soups to help keep food down.


Patient is no longer vomiting in the ED, she is able to tolerate by mouth with 

juice and crackers prior to discharge


I discussed to follow up with Dr. Luis Haynes as per her regular scheduled 

appointment in 2 weeks


Vital signs are normalized patient is in no acute distress


Show signs of instructions given.





Critical care attestation.: 


If time is entered above; I have spent that time in minutes in the direct care 

of this critically ill patient, excluding procedure time.








ED Disposition


Clinical Impression: 


 Nausea and vomiting during pregnancy, Normal pregnancy in first trimester, 

Cystitis





Disposition:  TO HOME OR SELFCARE


Is pt being admited?: No


Does the pt Need Aspirin: No


Condition: Stable


Instructions:  Morning Sickness (ED), Pregnancy (ED), Hyperemesis Gravidarum (ED

), Urinary Tract Infection in Women (ED)


Additional Instructions: 


Make sure to follow up with the OB/GYN as discussed.


Take all your medications as you've been prescribed.


Keep hydrated daily- 8-10 glasses


If you have any worsening symptoms or develop new symptoms please return to ED 

immediately.


Prescriptions: 


Doxylamine Succinate/Vit B6 [Diclegis Dr 10-10 mg Tablet] 2 tab PO QHS #60 

tablet.


Referrals: 


LUIS HAYNES MD [Primary Care Provider] - 3-5 Days


Forms:  Work/School Release Form(ED)


Time of Disposition: 22:11

## 2018-04-03 NOTE — EMERGENCY DEPARTMENT REPORT
Blank Doc





- Documentation


Documentation: 





Patient is a 32-year-old female who is presenting with nausea vomiting.  

Patient was admitted to the hospital week and a half ago for intractable nausea 

vomiting and hyperemesis gravidarum.  Patient states that she is still unable 

to keep anything down.  Patient states she is dizzy when she stands.


Patient be started on IV fluids check la.  Patient's cellulitis Luis Haynes 

he saw her today and sent her in for probable admission.

## 2018-04-13 ENCOUNTER — HOSPITAL ENCOUNTER (INPATIENT)
Dept: HOSPITAL 5 - 3A | Age: 33
LOS: 4 days | Discharge: HOME | DRG: 781 | End: 2018-04-17
Attending: OBSTETRICS & GYNECOLOGY | Admitting: OBSTETRICS & GYNECOLOGY
Payer: MEDICAID

## 2018-04-13 DIAGNOSIS — O21.1: Primary | ICD-10-CM

## 2018-04-13 DIAGNOSIS — Z79.899: ICD-10-CM

## 2018-04-13 DIAGNOSIS — Z3A.10: ICD-10-CM

## 2018-04-13 LAB
BASOPHILS # (AUTO): 0.1 K/MM3 (ref 0–0.1)
BASOPHILS NFR BLD AUTO: 0.7 % (ref 0–1.8)
BILIRUB UR QL STRIP: (no result)
BLOOD UR QL VISUAL: (no result)
BUN SERPL-MCNC: 7 MG/DL (ref 7–17)
BUN/CREAT SERPL: 18 %
CALCIUM SERPL-MCNC: 9 MG/DL (ref 8.4–10.2)
EOSINOPHIL # BLD AUTO: 0 K/MM3 (ref 0–0.4)
EOSINOPHIL NFR BLD AUTO: 0.5 % (ref 0–4.3)
HCT VFR BLD CALC: 39 % (ref 30.3–42.9)
HEMOLYSIS INDEX: 43
HGB BLD-MCNC: 13.3 GM/DL (ref 10.1–14.3)
LIPASE SERPL-CCNC: 55 UNITS/L (ref 13–60)
LYMPHOCYTES # BLD AUTO: 2.5 K/MM3 (ref 1.2–5.4)
LYMPHOCYTES NFR BLD AUTO: 34 % (ref 13.4–35)
MCH RBC QN AUTO: 28 PG (ref 28–32)
MCHC RBC AUTO-ENTMCNC: 34 % (ref 30–34)
MCV RBC AUTO: 81 FL (ref 79–97)
MONOCYTES # (AUTO): 0.6 K/MM3 (ref 0–0.8)
MONOCYTES % (AUTO): 7.8 % (ref 0–7.3)
MUCOUS THREADS #/AREA URNS HPF: (no result) /HPF
PH UR STRIP: 8 [PH] (ref 5–7)
PLATELET # BLD: 304 K/MM3 (ref 140–440)
RBC # BLD AUTO: 4.84 M/MM3 (ref 3.65–5.03)
RBC #/AREA URNS HPF: 12 /HPF (ref 0–6)
UROBILINOGEN UR-MCNC: 2 MG/DL (ref ?–2)
WBC #/AREA URNS HPF: 3 /HPF (ref 0–6)

## 2018-04-13 PROCEDURE — 85025 COMPLETE CBC W/AUTO DIFF WBC: CPT

## 2018-04-13 PROCEDURE — 81001 URINALYSIS AUTO W/SCOPE: CPT

## 2018-04-13 PROCEDURE — 80048 BASIC METABOLIC PNL TOTAL CA: CPT

## 2018-04-13 PROCEDURE — 84443 ASSAY THYROID STIM HORMONE: CPT

## 2018-04-13 PROCEDURE — 82150 ASSAY OF AMYLASE: CPT

## 2018-04-13 PROCEDURE — 82010 KETONE BODYS QUAN: CPT

## 2018-04-13 PROCEDURE — 83690 ASSAY OF LIPASE: CPT

## 2018-04-13 PROCEDURE — 36415 COLL VENOUS BLD VENIPUNCTURE: CPT

## 2018-04-13 RX ADMIN — METOCLOPRAMIDE SCH MG: 5 INJECTION, SOLUTION INTRAMUSCULAR; INTRAVENOUS at 18:35

## 2018-04-13 RX ADMIN — PROMETHAZINE HYDROCHLORIDE SCH: 25 SUPPOSITORY RECTAL at 22:07

## 2018-04-13 RX ADMIN — DEXTROSE, SODIUM CHLORIDE, SODIUM LACTATE, POTASSIUM CHLORIDE, AND CALCIUM CHLORIDE SCH MLS/HR: 5; .6; .31; .03; .02 INJECTION, SOLUTION INTRAVENOUS at 18:35

## 2018-04-13 NOTE — SHORT STAY SUMMARY
Short Stay Documentation


Date of service: 18


Narrative H&P: 





Pt is a 31yo BF  LMP EDC 18; EGA 10 2/ weeks by u/s presented to the 

office with a complaint of persistent nausea, vomiting and dehydration.  She 

says that the symptoms improved after being discharged 3/31/18, but reoccurred 

over the past 3 days.  She denies recent travel or sick contacts at home.  She 

says she is unable to keep down any liquids or solids despite her medications. 

No vaginal bleeding or dysuria. 








- History


Principal diagnosis: Hyperemesis


H&P: obtained from office


Past Medical History: No medical history


Past Surgical History: No surgical history


Social history: no significant social history, single





- Allergies and Medications


Current Medications: 


 Allergies





No Known Allergies Allergy (Verified 18 12:56)


 





 Home Medications











 Medication  Instructions  Recorded  Confirmed  Last Taken  Type


 


Ondansetron [Zofran ODT TAB] 4 mg PO Q4H PRN #30 tab.samanta 18  Unknown Rx


 


Doxylamine Succinate/Vit B6 2 tab PO QHS #60 tablet. 18  Unknown Rx





[Diclegis Dr 10-10 mg Tablet]     








Active Medications





Acetaminophen/Hydrocodone Bitart (Norco 5/325)  2 each PO Q6H PRN


   PRN Reason: Pain, Moderate (4-6)


Dextrose/Lactated Ringer's (D5lr)  1,000 mls @ 500 mls/hr IV AS DIRECT JOHN


   Stop: 18 15:59


   Last Admin: 18 13:43 Dose:  500 mls/hr


Dextrose/Lactated Ringer's (D5lr)  1,000 mls @ 500 mls/hr IV AS DIRECT JOHN


   Stop: 18 15:59


Dextrose/Lactated Ringer's (D5lr)  1,000 mls @ 150 mls/hr IV AS DIRECT JOHN


Metoclopramide HCl (Reglan)  10 mg IV Q6H JOHN


Multivitamins/Iron/Calcium (Prenatal Vitamin)  1 each PO QDAY JOHN


Ondansetron HCl (Zofran)  4 mg IV Q8H PRN


   PRN Reason: Nausea And Vomiting


   Last Admin: 18 13:43 Dose:  4 mg


Ondansetron HCl (Zofran)  4 mg IV Q6H PRN


   PRN Reason: N/V unrelieved by Reglan


Promethazine HCl (Phenergan)  25 mg LA Q6H JOHN











- Physical exam


General appearance: mild distress


Integumentary: no rash


HEENT: Atraumatic


Lungs: Clear to auscultation


Breasts: deferred


Heart: Regular rate


Gastrointestinal: normal


Female Genitourinary: deferred


Rectal Exam: deferred


Extremities: no ischemia


Neurological: Normal gait, Normal speech





- Hospital course


Hospital course: 





Pt was managed with IV hydration and IV antiemetics.  After 3 days her symptoms 

resolved, and she is now tolerating a reg diet without nausea or vomiting. She 

will therefore be discharged to home in stable condition.





- Disposition


Condition at discharge: Good


Disposition: DC-01 TO HOME OR SELFCARE





Short Stay Discharge Plan


Activity: no restrictions


Diet: regular


Follow up with: 


BALA REVELES MD [Primary Care Provider] - 7 Days


DAE LOPEZ CNM [Advanced Practice Nurse] - 7 Days


Prescriptions: 


Ondansetron [Zofran ODT TAB] 4 mg PO Q4H PRN #30 tab.rapdis


 PRN Reason: Nausea And Vomiting


Promethazine [Phenergan TAB] 25 mg PO Q6H PRN #30 tablet


 PRN Reason: Nausea

## 2018-04-14 RX ADMIN — PROMETHAZINE HYDROCHLORIDE SCH: 25 SUPPOSITORY RECTAL at 04:04

## 2018-04-14 RX ADMIN — PROMETHAZINE HYDROCHLORIDE SCH: 25 SUPPOSITORY RECTAL at 22:08

## 2018-04-14 RX ADMIN — METOCLOPRAMIDE SCH: 5 INJECTION, SOLUTION INTRAMUSCULAR; INTRAVENOUS at 17:50

## 2018-04-14 RX ADMIN — Medication SCH: at 10:56

## 2018-04-14 RX ADMIN — DEXTROSE, SODIUM CHLORIDE, SODIUM LACTATE, POTASSIUM CHLORIDE, AND CALCIUM CHLORIDE SCH MLS/HR: 5; .6; .31; .03; .02 INJECTION, SOLUTION INTRAVENOUS at 17:56

## 2018-04-14 RX ADMIN — ONDANSETRON PRN MG: 2 INJECTION INTRAMUSCULAR; INTRAVENOUS at 10:32

## 2018-04-14 RX ADMIN — DEXTROSE, SODIUM CHLORIDE, SODIUM LACTATE, POTASSIUM CHLORIDE, AND CALCIUM CHLORIDE SCH MLS/HR: 5; .6; .31; .03; .02 INJECTION, SOLUTION INTRAVENOUS at 09:59

## 2018-04-14 RX ADMIN — PROMETHAZINE HYDROCHLORIDE SCH: 25 SUPPOSITORY RECTAL at 10:56

## 2018-04-14 RX ADMIN — ONDANSETRON PRN MG: 2 INJECTION INTRAMUSCULAR; INTRAVENOUS at 09:59

## 2018-04-14 RX ADMIN — METOCLOPRAMIDE SCH: 5 INJECTION, SOLUTION INTRAMUSCULAR; INTRAVENOUS at 22:08

## 2018-04-14 RX ADMIN — PROMETHAZINE HYDROCHLORIDE SCH: 25 SUPPOSITORY RECTAL at 15:50

## 2018-04-14 NOTE — PROGRESS NOTE
Assessment and Plan





- Patient Problems


(1) 10 weeks gestation of pregnancy


Onset Date: 04/14/18   Current Visit: Yes   Status: Acute   


Plan to address problem: 


A:  IUP @ 10 3/7 weeks


      Hyperemesis gravidarum


 


 P:  Continue present management


     Advance diet as tolerated








(2) Hyperemesis gravidarum


Onset Date: 03/25/18   Current Visit: No   Status: Resolved   





Subjective





- Subjective


Date of service: 04/14/18


Principal diagnosis: IUP @ 10 3/7 weeks; Hyperemesis


Interval history: 





Pt states she is feeling a little better.  Tolerating ice chips.


Patient reports: no new complaints, no loss of fluid, no vaginal bleeding





Objective





- Vital Signs


Vital Signs: 


 Vital Signs - 12hr











  04/13/18 04/14/18 04/14/18





  21:05 00:35 04:00


 


Temperature 98.2 F 98.7 F 98.5 F


 


Pulse Rate 90 85 87


 


Respiratory 18 18 18





Rate   


 


Blood Pressure 102/62 96/53 104/63





[Left]   














- Exam


Abdomen: Present: normal appearance, soft





- Labs


Labs: 


 Abnormal Labs











  04/13/18 04/13/18 04/13/18





  14:23 15:05 15:05


 


Mono % (Auto)   7.8 H 


 


Sodium   


 


Chloride   


 


Creatinine   


 


Glucose   


 


TSH    0.140 L


 


Urine pH  8.0 H  


 


U Epithel Cells (Auto)  15.0 H  














  04/13/18





  15:05


 


Mono % (Auto) 


 


Sodium  133 L


 


Chloride  92.9 L


 


Creatinine  0.4 L


 


Glucose  181 H


 


TSH 


 


Urine pH 


 


U Epithel Cells (Auto) 








 Laboratory Results - last 24 hr











  04/13/18 04/13/18 04/13/18





  14:23 15:05 15:05


 


WBC   7.4 


 


RBC   4.84 


 


Hgb   13.3 


 


Hct   39.0 


 


MCV   81 


 


MCH   28 


 


MCHC   34 


 


RDW   13.9 


 


Plt Count   304 


 


Lymph % (Auto)   34.0 


 


Mono % (Auto)   7.8 H 


 


Eos % (Auto)   0.5 


 


Baso % (Auto)   0.7 


 


Lymph #   2.5 


 


Mono #   0.6 


 


Eos #   0.0 


 


Baso #   0.1 


 


Seg Neutrophils %   57.0 


 


Seg Neutrophils #   4.2 


 


Sodium   


 


Potassium   


 


Chloride   


 


Carbon Dioxide   


 


Anion Gap   


 


BUN   


 


Creatinine   


 


Estimated GFR   


 


BUN/Creatinine Ratio   


 


Glucose   


 


Calcium   


 


Amylase    112


 


Lipase    55


 


TSH   


 


Urine Color  Rosi  


 


Urine Turbidity  Clear  


 


Urine pH  8.0 H  


 


Ur Specific Gravity  1.029  


 


Urine Protein  100 mg/dl  


 


Urine Glucose (UA)  Neg  


 


Urine Ketones  20  


 


Urine Blood  Neg  


 


Urine Nitrite  Neg  


 


Urine Bilirubin  Neg  


 


Urine Urobilinogen  2.0  


 


Ur Leukocyte Esterase  Neg  


 


Urine WBC (Auto)  3.0  


 


Urine RBC (Auto)  12.0  


 


U Epithel Cells (Auto)  15.0 H  


 


Urine Mucus  3+  














  04/13/18 04/13/18





  15:05 15:05


 


WBC  


 


RBC  


 


Hgb  


 


Hct  


 


MCV  


 


MCH  


 


MCHC  


 


RDW  


 


Plt Count  


 


Lymph % (Auto)  


 


Mono % (Auto)  


 


Eos % (Auto)  


 


Baso % (Auto)  


 


Lymph #  


 


Mono #  


 


Eos #  


 


Baso #  


 


Seg Neutrophils %  


 


Seg Neutrophils #  


 


Sodium   133 L


 


Potassium   3.7


 


Chloride   92.9 L


 


Carbon Dioxide   24


 


Anion Gap   20


 


BUN   7


 


Creatinine   0.4 L


 


Estimated GFR   > 60


 


BUN/Creatinine Ratio   18


 


Glucose   181 H


 


Calcium   9.0


 


Amylase  


 


Lipase  


 


TSH  0.140 L 


 


Urine Color  


 


Urine Turbidity  


 


Urine pH  


 


Ur Specific Gravity  


 


Urine Protein  


 


Urine Glucose (UA)  


 


Urine Ketones  


 


Urine Blood  


 


Urine Nitrite  


 


Urine Bilirubin  


 


Urine Urobilinogen  


 


Ur Leukocyte Esterase  


 


Urine WBC (Auto)  


 


Urine RBC (Auto)  


 


U Epithel Cells (Auto)  


 


Urine Mucus

## 2018-04-15 RX ADMIN — PROMETHAZINE HYDROCHLORIDE SCH MG: 25 SUPPOSITORY RECTAL at 21:56

## 2018-04-15 RX ADMIN — DEXTROSE, SODIUM CHLORIDE, SODIUM LACTATE, POTASSIUM CHLORIDE, AND CALCIUM CHLORIDE SCH MLS/HR: 5; .6; .31; .03; .02 INJECTION, SOLUTION INTRAVENOUS at 01:39

## 2018-04-15 RX ADMIN — PROMETHAZINE HYDROCHLORIDE SCH: 25 SUPPOSITORY RECTAL at 08:00

## 2018-04-15 RX ADMIN — PROMETHAZINE HYDROCHLORIDE SCH: 25 SUPPOSITORY RECTAL at 14:00

## 2018-04-15 RX ADMIN — METOCLOPRAMIDE SCH: 5 INJECTION, SOLUTION INTRAMUSCULAR; INTRAVENOUS at 10:00

## 2018-04-15 RX ADMIN — DEXTROSE, SODIUM CHLORIDE, SODIUM LACTATE, POTASSIUM CHLORIDE, AND CALCIUM CHLORIDE SCH MLS/HR: 5; .6; .31; .03; .02 INJECTION, SOLUTION INTRAVENOUS at 09:00

## 2018-04-15 RX ADMIN — METOCLOPRAMIDE SCH: 5 INJECTION, SOLUTION INTRAMUSCULAR; INTRAVENOUS at 22:47

## 2018-04-15 RX ADMIN — Medication SCH: at 10:02

## 2018-04-15 RX ADMIN — DEXTROSE, SODIUM CHLORIDE, SODIUM LACTATE, POTASSIUM CHLORIDE, AND CALCIUM CHLORIDE SCH MLS/HR: 5; .6; .31; .03; .02 INJECTION, SOLUTION INTRAVENOUS at 17:16

## 2018-04-15 RX ADMIN — ONDANSETRON PRN MG: 2 INJECTION INTRAMUSCULAR; INTRAVENOUS at 06:40

## 2018-04-15 RX ADMIN — PROMETHAZINE HYDROCHLORIDE SCH: 25 SUPPOSITORY RECTAL at 02:30

## 2018-04-15 RX ADMIN — METOCLOPRAMIDE SCH: 5 INJECTION, SOLUTION INTRAMUSCULAR; INTRAVENOUS at 16:00

## 2018-04-15 RX ADMIN — ONDANSETRON PRN MG: 2 INJECTION INTRAMUSCULAR; INTRAVENOUS at 17:11

## 2018-04-15 NOTE — PROGRESS NOTE
Assessment and Plan





- Patient Problems


(1) 10 weeks gestation of pregnancy


Onset Date: 04/14/18   Current Visit: Yes   Status: Acute   


Plan to address problem: 


A:  IUP @ 10 4/7 weeks


      Hyperemesis gravidarum - improving


 


 P:  Continue present management


     Advance diet as tolerated








(2) Hyperemesis gravidarum


Onset Date: 03/25/18   Current Visit: No   Status: Resolved   





Subjective





- Subjective


Date of service: 04/15/18


Principal diagnosis: IUP @ 10 4/7 weeks; Hyperemesis


Interval history: 





Pt states she is feeling a little better.  Tolerating liquid diet, wants to try 

to eat.


Patient reports: no new complaints, no loss of fluid, no vaginal bleeding





Objective





- Vital Signs


Vital Signs: 


 Vital Signs - 12hr











  04/15/18 04/15/18 04/15/18





  00:45 06:35 09:32


 


Temperature 97.9 F 98.3 F 98.7 F


 


Pulse Rate 85 89 76


 


Respiratory 18 18 18





Rate   


 


Blood Pressure   105/62


 


Blood Pressure 96/58 115/71 





[Left]   


 


O2 Sat by Pulse   100





Oximetry   














- Exam


Cardiovascular: Regular rate


Lungs: Clear to auscultation


Abdomen: Present: normal appearance, soft





- Labs


Labs: 


 Abnormal Labs











  04/13/18 04/13/18 04/13/18





  14:23 15:05 15:05


 


Mono % (Auto)   7.8 H 


 


Sodium   


 


Chloride   


 


Creatinine   


 


Glucose   


 


TSH    0.140 L


 


Urine pH  8.0 H  


 


U Epithel Cells (Auto)  15.0 H  














  04/13/18





  15:05


 


Mono % (Auto) 


 


Sodium  133 L


 


Chloride  92.9 L


 


Creatinine  0.4 L


 


Glucose  181 H


 


TSH 


 


Urine pH 


 


U Epithel Cells (Auto) 








 Laboratory Results - last 24 hr











  04/14/18





  03:18


 


Urine Ketones  Neg

## 2018-04-16 RX ADMIN — METOCLOPRAMIDE SCH: 5 INJECTION, SOLUTION INTRAMUSCULAR; INTRAVENOUS at 09:26

## 2018-04-16 RX ADMIN — DEXTROSE, SODIUM CHLORIDE, SODIUM LACTATE, POTASSIUM CHLORIDE, AND CALCIUM CHLORIDE SCH MLS/HR: 5; .6; .31; .03; .02 INJECTION, SOLUTION INTRAVENOUS at 12:44

## 2018-04-16 RX ADMIN — PROMETHAZINE HYDROCHLORIDE SCH MG: 25 SUPPOSITORY RECTAL at 04:01

## 2018-04-16 RX ADMIN — DEXTROSE, SODIUM CHLORIDE, SODIUM LACTATE, POTASSIUM CHLORIDE, AND CALCIUM CHLORIDE SCH MLS/HR: 5; .6; .31; .03; .02 INJECTION, SOLUTION INTRAVENOUS at 00:40

## 2018-04-16 RX ADMIN — PROMETHAZINE HYDROCHLORIDE SCH: 25 SUPPOSITORY RECTAL at 08:00

## 2018-04-16 RX ADMIN — ONDANSETRON PRN MG: 2 INJECTION INTRAMUSCULAR; INTRAVENOUS at 09:26

## 2018-04-16 RX ADMIN — METOCLOPRAMIDE SCH: 5 INJECTION, SOLUTION INTRAMUSCULAR; INTRAVENOUS at 16:30

## 2018-04-16 RX ADMIN — Medication SCH EACH: at 09:26

## 2018-04-16 RX ADMIN — PROMETHAZINE HYDROCHLORIDE SCH: 25 SUPPOSITORY RECTAL at 14:00

## 2018-04-16 RX ADMIN — DEXTROSE, SODIUM CHLORIDE, SODIUM LACTATE, POTASSIUM CHLORIDE, AND CALCIUM CHLORIDE SCH MLS/HR: 5; .6; .31; .03; .02 INJECTION, SOLUTION INTRAVENOUS at 19:03

## 2018-04-16 RX ADMIN — ONDANSETRON PRN MG: 2 INJECTION INTRAMUSCULAR; INTRAVENOUS at 22:02

## 2018-04-16 RX ADMIN — PROMETHAZINE HYDROCHLORIDE SCH: 25 SUPPOSITORY RECTAL at 04:10

## 2018-04-16 RX ADMIN — METOCLOPRAMIDE SCH: 5 INJECTION, SOLUTION INTRAMUSCULAR; INTRAVENOUS at 04:00

## 2018-04-16 NOTE — PROGRESS NOTE
Assessment and Plan





- Patient Problems


(1) 10 weeks gestation of pregnancy


Onset Date: 04/14/18   Current Visit: Yes   Status: Acute   


Plan to address problem: 


A:  IUP @ 10 5/7 weeks


      Hyperemesis gravidarum - improving


 


 P:  Continue present management


     Anticipate discharge tomorrow








(2) Hyperemesis gravidarum


Onset Date: 03/25/18   Current Visit: No   Status: Resolved   





Subjective





- Subjective


Date of service: 04/16/18


Principal diagnosis: IUP @ 10 4/7 weeks; Hyperemesis


Interval history: 





Pt states she is feeling a little better.  Tolerating reg bland diet, but no 

appetite.


Patient reports: no new complaints, no loss of fluid, no vaginal bleeding





Objective





- Vital Signs


Vital Signs: 


 Vital Signs - 12hr











  04/16/18 04/16/18 04/16/18





  04:10 08:02 08:47


 


Temperature 98.2 F  98.6 F


 


Pulse Rate 75 72 


 


Respiratory 18 18 





Rate   


 


Blood Pressure  92/56 


 


Blood Pressure 110/58  





[Left]   


 


O2 Sat by Pulse  98 





Oximetry   














  04/16/18





  12:00


 


Temperature 98.5 F


 


Pulse Rate 49 L


 


Respiratory 18





Rate 


 


Blood Pressure 


 


Blood Pressure 97/62





[Left] 


 


O2 Sat by Pulse 98





Oximetry 














- Exam


Breasts: deferred


Cardiovascular: Regular rate


Abdomen: Present: normal appearance, soft





- Labs


Labs: 


 Abnormal Labs











  04/13/18 04/13/18 04/13/18





  14:23 15:05 15:05


 


Mono % (Auto)   7.8 H 


 


Sodium   


 


Chloride   


 


Creatinine   


 


Glucose   


 


TSH    0.140 L


 


Urine pH  8.0 H  


 


U Epithel Cells (Auto)  15.0 H  














  04/13/18





  15:05


 


Mono % (Auto) 


 


Sodium  133 L


 


Chloride  92.9 L


 


Creatinine  0.4 L


 


Glucose  181 H


 


TSH 


 


Urine pH 


 


U Epithel Cells (Auto)

## 2018-04-17 VITALS — SYSTOLIC BLOOD PRESSURE: 125 MMHG | DIASTOLIC BLOOD PRESSURE: 71 MMHG

## 2018-04-17 RX ADMIN — DEXTROSE, SODIUM CHLORIDE, SODIUM LACTATE, POTASSIUM CHLORIDE, AND CALCIUM CHLORIDE SCH MLS/HR: 5; .6; .31; .03; .02 INJECTION, SOLUTION INTRAVENOUS at 03:58

## 2018-04-17 RX ADMIN — PROMETHAZINE HYDROCHLORIDE SCH: 25 SUPPOSITORY RECTAL at 08:03

## 2018-04-17 RX ADMIN — METOCLOPRAMIDE SCH: 5 INJECTION, SOLUTION INTRAMUSCULAR; INTRAVENOUS at 10:18

## 2018-04-17 RX ADMIN — ONDANSETRON PRN MG: 2 INJECTION INTRAMUSCULAR; INTRAVENOUS at 12:24

## 2018-04-17 RX ADMIN — ONDANSETRON PRN MG: 2 INJECTION INTRAMUSCULAR; INTRAVENOUS at 03:53

## 2018-04-17 NOTE — PROGRESS NOTE
Assessment and Plan





- Patient Problems


(1) 10 weeks gestation of pregnancy


Onset Date: 04/14/18   Current Visit: Yes   Status: Acute   


Plan to address problem: 


A:  IUP @ 10 6/7 weeks


      Hyperemesis gravidarum - resolved


 


 P:  May go home today








(2) Hyperemesis gravidarum


Onset Date: 03/25/18   Current Visit: No   Status: Resolved   





Subjective





- Subjective


Date of service: 04/17/18


Principal diagnosis: IUP @ 10 6/7 weeks; Hyperemesis


Interval history: 





Pt states she is feeling well, tolerating a reg diet without nausea or vomiting.


Patient reports: no new complaints, no loss of fluid, no vaginal bleeding





Objective





- Vital Signs


Vital Signs: 


 Vital Signs - 12hr











  04/17/18 04/17/18 04/17/18





  00:45 05:10 07:35


 


Temperature 98.4 F 98.2 F 98.5 F


 


Pulse Rate 79 73 76


 


Respiratory 18 18 18





Rate   


 


Blood Pressure 96/43 102/56 100/58





[Left]   


 


O2 Sat by Pulse 97 98 98





Oximetry   














- Exam


Cardiovascular: Regular rate


Lungs: Clear to auscultation


Abdomen: Present: normal appearance, soft





- Labs


Labs: 


 Abnormal Labs











  04/13/18 04/13/18 04/13/18





  14:23 15:05 15:05


 


Mono % (Auto)   7.8 H 


 


Sodium   


 


Chloride   


 


Creatinine   


 


Glucose   


 


TSH    0.140 L


 


Urine pH  8.0 H  


 


U Epithel Cells (Auto)  15.0 H  














  04/13/18





  15:05


 


Mono % (Auto) 


 


Sodium  133 L


 


Chloride  92.9 L


 


Creatinine  0.4 L


 


Glucose  181 H


 


TSH 


 


Urine pH 


 


U Epithel Cells (Auto)

## 2018-04-21 ENCOUNTER — HOSPITAL ENCOUNTER (EMERGENCY)
Dept: HOSPITAL 5 - ED | Age: 33
Discharge: HOME | End: 2018-04-21
Payer: MEDICAID

## 2018-04-21 VITALS — SYSTOLIC BLOOD PRESSURE: 118 MMHG | DIASTOLIC BLOOD PRESSURE: 75 MMHG

## 2018-04-21 DIAGNOSIS — O20.0: Primary | ICD-10-CM

## 2018-04-21 DIAGNOSIS — Z88.8: ICD-10-CM

## 2018-04-21 DIAGNOSIS — Z3A.12: ICD-10-CM

## 2018-04-21 LAB
ALBUMIN SERPL-MCNC: 4.4 G/DL (ref 3.9–5)
ALT SERPL-CCNC: 10 UNITS/L (ref 7–56)
BASOPHILS # (AUTO): 0 K/MM3 (ref 0–0.1)
BASOPHILS NFR BLD AUTO: 0.5 % (ref 0–1.8)
BILIRUB UR QL STRIP: (no result)
BLOOD UR QL VISUAL: (no result)
BUN SERPL-MCNC: 8 MG/DL (ref 7–17)
BUN/CREAT SERPL: 16 %
CALCIUM SERPL-MCNC: 9.7 MG/DL (ref 8.4–10.2)
EOSINOPHIL # BLD AUTO: 0 K/MM3 (ref 0–0.4)
EOSINOPHIL NFR BLD AUTO: 0.4 % (ref 0–4.3)
HCT VFR BLD CALC: 43.8 % (ref 30.3–42.9)
HEMOLYSIS INDEX: 18
HGB BLD-MCNC: 14.2 GM/DL (ref 10.1–14.3)
LYMPHOCYTES # BLD AUTO: 2.3 K/MM3 (ref 1.2–5.4)
LYMPHOCYTES NFR BLD AUTO: 32.9 % (ref 13.4–35)
MCH RBC QN AUTO: 26 PG (ref 28–32)
MCHC RBC AUTO-ENTMCNC: 33 % (ref 30–34)
MCV RBC AUTO: 81 FL (ref 79–97)
MONOCYTES # (AUTO): 0.7 K/MM3 (ref 0–0.8)
MONOCYTES % (AUTO): 9.8 % (ref 0–7.3)
MUCOUS THREADS #/AREA URNS HPF: (no result) /HPF
PH UR STRIP: 5 [PH] (ref 5–7)
PLATELET # BLD: 331 K/MM3 (ref 140–440)
RBC # BLD AUTO: 5.38 M/MM3 (ref 3.65–5.03)
RBC #/AREA URNS HPF: 5 /HPF (ref 0–6)
UROBILINOGEN UR-MCNC: < 2 MG/DL (ref ?–2)
WBC #/AREA URNS HPF: 5 /HPF (ref 0–6)

## 2018-04-21 PROCEDURE — 80053 COMPREHEN METABOLIC PANEL: CPT

## 2018-04-21 PROCEDURE — 86901 BLOOD TYPING SEROLOGIC RH(D): CPT

## 2018-04-21 PROCEDURE — 85025 COMPLETE CBC W/AUTO DIFF WBC: CPT

## 2018-04-21 PROCEDURE — 81001 URINALYSIS AUTO W/SCOPE: CPT

## 2018-04-21 PROCEDURE — 86900 BLOOD TYPING SEROLOGIC ABO: CPT

## 2018-04-21 PROCEDURE — 76801 OB US < 14 WKS SINGLE FETUS: CPT

## 2018-04-21 PROCEDURE — 36415 COLL VENOUS BLD VENIPUNCTURE: CPT

## 2018-04-21 PROCEDURE — 86850 RBC ANTIBODY SCREEN: CPT

## 2018-04-21 PROCEDURE — 96361 HYDRATE IV INFUSION ADD-ON: CPT

## 2018-04-21 PROCEDURE — 84702 CHORIONIC GONADOTROPIN TEST: CPT

## 2018-04-21 PROCEDURE — 99284 EMERGENCY DEPT VISIT MOD MDM: CPT

## 2018-04-21 PROCEDURE — 96374 THER/PROPH/DIAG INJ IV PUSH: CPT

## 2018-04-21 NOTE — EMERGENCY DEPARTMENT REPORT
ED General Adult HPI





- General


Chief complaint: Nausea/Vomiting/Diarrhea


Stated complaint: VAG BLEED


Time Seen by Provider: 18 16:14


Source: patient


Mode of arrival: Ambulatory


Limitations: No Limitations





- History of Present Illness


Initial comments: 





Approximately 12 weeks by ultrasound EGA with vaginal spotting not soaking pass 

no syncope here for evaluation of intermittent spotting with some lower 

abdominal cramps , no other co, no cp no fever no heavy bleeding, vss, no 

acute abd  on exam now


-: Gradual, unknown


Radiation: non-radiation


Consistency: intermittent


Worsens with: none


Associated Symptoms: denies: confusion, chest pain, cough, diaphoresis, fever/

chills, headaches, loss of appetite, malaise, nausea/vomiting, rash, seizure, 

shortness of breath, syncope, weakness





- Related Data


 Home Medications











 Medication  Instructions  Recorded  Confirmed  Last Taken


 


No Known Home Medications [No  10/05/16 10/05/16 Unknown





Reported Home Medications]    








 Previous Rx's











 Medication  Instructions  Recorded  Last Taken  Type


 


Azithromycin [Zithromax TAB] 500 mg PO ONCE #2 tablet 14 Unknown Rx


 


Ondansetron [Zofran Odt] 8 mg PO TID PRN #20 tab.rapdis 14 Unknown Rx


 


Ciprofloxacin HCl [Ciprofloxacin 500 mg PO BID #14 tablet 14 Unknown Rx





TAB]    


 


Ondansetron [Zofran] 4 mg PO Q6HR PRN #20 tablet 14 Unknown Rx


 


Cephalexin [Keflex] 500 mg PO TID #21 capsule 05/07/15 Unknown Rx


 


HYDROcodone/APAP  [[place] 1 each PO Q4-6H PRN #16 tablet 05/07/15 Unknown 

Rx





10/325]    


 


HYDROcodone/APAP 5-325 [[place] 1 each PO Q6HR PRN #14 tablet 11/23/15 Unknown Rx





5/325]    


 


Acetaminophen/Codeine [Tylenol #3] 1 tab PO Q6H PRN #20 tab 16 Unknown Rx


 


Sulfamethoxazole/Trimethoprim 1 each PO BID #20 tablet 16 Unknown Rx





[Bactrim DS TAB]    


 


Doxylamine Succinate/Vit B6 2 tab PO QHS #60 tablet. 18 Unknown Rx





[Diclegis Dr 10-10 mg Tablet]    


 


Ondansetron [Zofran ODT TAB] 4 mg PO Q4H PRN #30 tab.rapdis 18 Unknown Rx


 


Promethazine [Phenergan TAB] 25 mg PO Q6H PRN #30 tablet 18 Unknown Rx











 Allergies











Allergy/AdvReac Type Severity Reaction Status Date / Time


 


ibuprofen [From Motrin] AdvReac  Headache Verified 11/23/15 11:25


 


metoclopramide [From Reglan] AdvReac  Dizziness Verified 18 10:37














ED Review of Systems


ROS: 


Stated complaint: VAG BLEED


Other details as noted in HPI





Comment: All other systems reviewed and negative


Constitutional: no symptoms reported.  denies: diaphoresis, fever, malaise


ENT: denies: dental pain, hearing loss, epistaxis


Respiratory: denies: shortness of breath, SOB with exertion, SOB at rest, 

stridor


Cardiovascular: denies: chest pain, palpitations, dyspnea on exertion, orthopnea

, syncope, paroxysmal nocturnal dyspnea


Gastrointestinal: other (no abdominal pain at present).  denies: abdominal pain

, nausea, vomiting, diarrhea, constipation, hematemesis, melena


Genitourinary: denies: urgency, dysuria, frequency, hematuria, discharge, 

abnormal menses, dyspareunia


Neurological: denies: headache, weakness, numbness, paresthesias, confusion


Psychiatric: denies: anxiety, depression, auditory hallucinations, visual 

hallucinations, homicidal thoughts, suicidal thoughts





ED Past Medical Hx





- Past Medical History


Hx Hypertension: No


Additional medical history:  A0





- Surgical History


Additional Surgical History: abortions.  cyst on leg removed





- Social History


Smoking Status: Never Smoker


Substance Use Type: None





- Medications


Home Medications: 


 Home Medications











 Medication  Instructions  Recorded  Confirmed  Last Taken  Type


 


Azithromycin [Zithromax TAB] 500 mg PO ONCE #2 tablet 14 Unknown 

Rx


 


Ondansetron [Zofran Odt] 8 mg PO TID PRN #20 tab.rapdis 14 

Unknown Rx


 


Ciprofloxacin HCl [Ciprofloxacin 500 mg PO BID #14 tablet 14  Unknown Rx





TAB]     


 


Ondansetron [Zofran] 4 mg PO Q6HR PRN #20 tablet 14  Unknown Rx


 


Cephalexin [Keflex] 500 mg PO TID #21 capsule 05/07/15  Unknown Rx


 


HYDROcodone/APAP  [[place] 1 each PO Q4-6H PRN #16 tablet 05/07/15  Unknown 

Rx





10/325]     


 


HYDROcodone/APAP 5-325 [[place] 1 each PO Q6HR PRN #14 tablet 11/23/15  Unknown Rx





5/325]     


 


Acetaminophen/Codeine [Tylenol #3] 1 tab PO Q6H PRN #20 tab 16  Unknown Rx


 


Sulfamethoxazole/Trimethoprim 1 each PO BID #20 tablet 16  Unknown Rx





[Bactrim DS TAB]     


 


No Known Home Medications [No  10/05/16 10/05/16 Unknown History





Reported Home Medications]     


 


Doxylamine Succinate/Vit B6 2 tab PO QHS #60 tablet.dr 18  Unknown Rx





[Diclegis Dr 10-10 mg Tablet]     


 


Ondansetron [Zofran ODT TAB] 4 mg PO Q4H PRN #30 tab.rapdis 18  Unknown Rx


 


Promethazine [Phenergan TAB] 25 mg PO Q6H PRN #30 tablet 18  Unknown Rx














ED Physical Exam





- General


Limitations: No Limitations


General appearance: alert, in no apparent distress, anxious





- Head


Head exam: Present: atraumatic, normocephalic





- Eye


Eye exam: Present: normal appearance, PERRL, EOMI





- ENT


ENT exam: Absent: normal exam, normal orophraynx, mucous membranes dry, mucous 

membranes moist





- Neck


Neck exam: Present: normal inspection.  Absent: tenderness, meningismus





- Respiratory


Respiratory exam: Present: normal lung sounds bilaterally.  Absent: respiratory 

distress, wheezes, rales, rhonchi, stridor, chest wall tenderness, accessory 

muscle use





- Cardiovascular


Cardiovascular Exam: Present: regular rate, normal rhythm, normal heart sounds.

  Absent: bradycardia, tachycardia, irregular rhythm





- GI/Abdominal


GI/Abdominal exam: Present: soft.  Absent: tenderness, guarding, rebound, 

organomegaly, mass, pulsatile mass





- Extremities Exam


Extremities exam: Present: normal inspection, normal capillary refill.  Absent: 

pedal edema, joint swelling





- Back Exam


Back exam: Present: normal inspection.  Absent: CVA tenderness (R), CVA 

tenderness (L), muscle spasm, paraspinal tenderness, vertebral tenderness





- Neurological Exam


Neurological exam: Present: alert, oriented X3, CN II-XII intact.  Absent: 

motor sensory deficit





- Psychiatric


Psychiatric exam: Present: normal affect, anxious





ED Course


 Vital Signs











  18





  10:32 16:14 16:15


 


Temperature 98.6 F  


 


Pulse Rate 101 H  89


 


Respiratory 16 16 14





Rate   


 


Blood Pressure 114/78  


 


Blood Pressure   





[Left]   


 


O2 Sat by Pulse 98 98 98





Oximetry   














  18





  16:16


 


Temperature 98.1 F


 


Pulse Rate 94 H


 


Respiratory 17





Rate 


 


Blood Pressure 


 


Blood Pressure 119/86





[Left] 


 


O2 Sat by Pulse 99





Oximetry 














ED Medical Decision Making





- Lab Data


Result diagrams: 


 18 13:20





 18 13:20





- Radiology Data


Radiology results: report reviewed





- Medical Decision Making





CBC H&H is stable blood type is positive ultrasound shows live IUP patient has 

no acute abdomen abdomen not soaking pads, not orthostatic vital signs stable, 

pt stable oupt f/u w/ pelvic rest, and instrucitns threated ab, to see pcp for 

repeat hcg and u/s , verballies understanging to return if worse or persistant 

or new or alarming sx


Critical care attestation.: 


If time is entered above; I have spent that time in minutes in the direct care 

of this critically ill patient, excluding procedure time.








ED Disposition


Clinical Impression: 


 Threatened 





Disposition: DC-01 TO HOME OR SELFCARE


Is pt being admited?: No


Condition: Stable


Instructions:  Threatened Miscarriage (ED)


Additional Instructions: 


Return if new or alarming symptoms or call 911 see her OB/GYN in 2 days or the 

doctor listed


Referrals: 


ROBERTO CARLOS MAYORGA MD [Primary Care Provider] - 3-5 Days


BALA REVELES MD [Staff Physician] - 3-5 Days


Time of Disposition: 19:33

## 2018-04-21 NOTE — ULTRASOUND REPORT
FINAL REPORT



EXAM:  US OB &lt; = 14 WEEKS FETUS



HISTORY:  vag bleed 



TECHNIQUE:  Transabdominal sonography of the pelvis. 



PRIORS:  24 March 2018.



FINDINGS:  

There is a single, live intrauterine pregnancy. Ultrasound

estimated gestational age is 12 weeks 2 days. Ultrasound

estimated date of confinement is 7 November 2018 (based on data

from comparison examination). Fetal heart motion is detected.

Hypoechoic fibroids x2 incidentally noted, largest located in

left posterior corpus region measuring 2.2 x 1.4 x 2.3 cm. 



The right ovary measures 3.6 x 1.8 x 2.2 cm and contains small,

complex cystic focus measuring up to 1.9 cm. The left ovary

measures 2.5 x 1.7 x 2.0 cm and is grossly unremarkable.

Remainder of uterus and adnexa grossly unremarkable. 



IMPRESSION:  

1. Single, live intrauterine pregnancy. 



2. Leiomyomatous change in the uterus. 



3. Probable functional cystic change in the right ovary.

## 2018-04-25 ENCOUNTER — HOSPITAL ENCOUNTER (INPATIENT)
Dept: HOSPITAL 5 - 3A | Age: 33
LOS: 9 days | Discharge: HOME | DRG: 770 | End: 2018-05-04
Attending: OBSTETRICS & GYNECOLOGY | Admitting: OBSTETRICS & GYNECOLOGY
Payer: MEDICAID

## 2018-04-25 DIAGNOSIS — O21.1: ICD-10-CM

## 2018-04-25 DIAGNOSIS — O02.1: Primary | ICD-10-CM

## 2018-04-25 DIAGNOSIS — Z79.899: ICD-10-CM

## 2018-04-25 DIAGNOSIS — E86.0: ICD-10-CM

## 2018-04-25 DIAGNOSIS — Z23: ICD-10-CM

## 2018-04-25 LAB
BUN SERPL-MCNC: 7 MG/DL (ref 7–17)
BUN/CREAT SERPL: 14 %
CALCIUM SERPL-MCNC: 9.5 MG/DL (ref 8.4–10.2)
HEMOLYSIS INDEX: 13
LIPASE SERPL-CCNC: 63 UNITS/L (ref 13–60)

## 2018-04-25 PROCEDURE — 85014 HEMATOCRIT: CPT

## 2018-04-25 PROCEDURE — 80074 ACUTE HEPATITIS PANEL: CPT

## 2018-04-25 PROCEDURE — 85018 HEMOGLOBIN: CPT

## 2018-04-25 PROCEDURE — 85027 COMPLETE CBC AUTOMATED: CPT

## 2018-04-25 PROCEDURE — 81001 URINALYSIS AUTO W/SCOPE: CPT

## 2018-04-25 PROCEDURE — 76700 US EXAM ABDOM COMPLETE: CPT

## 2018-04-25 PROCEDURE — 83690 ASSAY OF LIPASE: CPT

## 2018-04-25 PROCEDURE — 80048 BASIC METABOLIC PNL TOTAL CA: CPT

## 2018-04-25 PROCEDURE — 88305 TISSUE EXAM BY PATHOLOGIST: CPT

## 2018-04-25 PROCEDURE — 76801 OB US < 14 WKS SINGLE FETUS: CPT

## 2018-04-25 PROCEDURE — 86900 BLOOD TYPING SEROLOGIC ABO: CPT

## 2018-04-25 PROCEDURE — 86901 BLOOD TYPING SEROLOGIC RH(D): CPT

## 2018-04-25 PROCEDURE — 36415 COLL VENOUS BLD VENIPUNCTURE: CPT

## 2018-04-25 PROCEDURE — 84132 ASSAY OF SERUM POTASSIUM: CPT

## 2018-04-25 PROCEDURE — 82150 ASSAY OF AMYLASE: CPT

## 2018-04-25 PROCEDURE — 80053 COMPREHEN METABOLIC PANEL: CPT

## 2018-04-25 PROCEDURE — C9113 INJ PANTOPRAZOLE SODIUM, VIA: HCPCS

## 2018-04-25 RX ADMIN — DEXTROSE, SODIUM CHLORIDE, SODIUM LACTATE, POTASSIUM CHLORIDE, AND CALCIUM CHLORIDE SCH MLS/HR: 5; .6; .31; .03; .02 INJECTION, SOLUTION INTRAVENOUS at 22:24

## 2018-04-25 RX ADMIN — DEXTROSE, SODIUM CHLORIDE, SODIUM LACTATE, POTASSIUM CHLORIDE, AND CALCIUM CHLORIDE SCH MLS/HR: 5; .6; .31; .03; .02 INJECTION, SOLUTION INTRAVENOUS at 20:41

## 2018-04-25 RX ADMIN — ONDANSETRON PRN MG: 2 INJECTION INTRAMUSCULAR; INTRAVENOUS at 20:52

## 2018-04-25 RX ADMIN — OXYCODONE AND ACETAMINOPHEN PRN TAB: 5; 325 TABLET ORAL at 20:36

## 2018-04-25 NOTE — SHORT STAY SUMMARY
Short Stay Documentation


Date of service: 18


Narrative H&P: 





Pt is a 31yo BF  LMP EDC 18; EGA 12 0 weeks by u/s presented to the 

office with a complaint of persistent nausea, vomiting and dehydration.  She 

says that the symptoms improved after being discharged 18, but reoccurred 

over the past 3 days.  She denies recent travel or sick contacts at home.  She 

says she is unable to keep down any liquids or solids despite her medications. 

No vaginal bleeding or dysuria. 








- History


Principal diagnosis: Hyperemesis


H&P: obtained from office


Social history: no significant social history, single





- Allergies and Medications


Current Medications: 


 Allergies





ibuprofen [From Motrin] Adverse Reaction (Verified 11/23/15 11:25)


 Headache


metoclopramide [From Reglan] Adverse Reaction (Verified 18 10:37)


 Dizziness





 Home Medications











 Medication  Instructions  Recorded  Confirmed  Last Taken  Type


 


Azithromycin [Zithromax TAB] 500 mg PO ONCE #2 tablet 14 Unknown 

Rx


 


Ondansetron [Zofran Odt] 8 mg PO TID PRN #20 tab.rapdis 14 

Unknown Rx


 


Ciprofloxacin HCl [Ciprofloxacin 500 mg PO BID #14 tablet 14  Unknown Rx





TAB]     


 


Ondansetron [Zofran] 4 mg PO Q6HR PRN #20 tablet 14  Unknown Rx


 


Cephalexin [Keflex] 500 mg PO TID #21 capsule 05/07/15  Unknown Rx


 


HYDROcodone/APAP  [Norco 1 each PO Q4-6H PRN #16 tablet 05/07/15  Unknown 

Rx





10/325]     


 


HYDROcodone/APAP 5-325 [[place] 1 each PO Q6HR PRN #14 tablet 11/23/15  Unknown Rx





5/325]     


 


Acetaminophen/Codeine [Tylenol #3] 1 tab PO Q6H PRN #20 tab 16  Unknown Rx


 


Sulfamethoxazole/Trimethoprim 1 each PO BID #20 tablet 16  Unknown Rx





[Bactrim DS TAB]     


 


No Known Home Medications [No  10/05/16 10/05/16 Unknown History





Reported Home Medications]     


 


Doxylamine Succinate/Vit B6 2 tab PO QHS #60 tablet. 18  Unknown Rx





[Diclegis Dr 10-10 mg Tablet]     


 


Ondansetron [Zofran ODT TAB] 4 mg PO Q4H PRN #30 tab.rapdis 18  Unknown Rx


 


Promethazine [Phenergan TAB] 25 mg PO Q6H PRN #30 tablet 18  Unknown Rx














- Physical exam


General appearance: mild distress


HEENT: Atraumatic


Lungs: Clear to auscultation


Breasts: deferred


Heart: Regular rate


Gastrointestinal: normal


Female Genitourinary: deferred


Extremities: no ischemia


Neurological: Normal gait, Normal speech





- Brief post op/procedure progress note


Date of procedure: 18


Pre-op diagnosis: 1.  Severe hyperemesis gravidarum  2.  IUFD    3.  Missed 




Post-op diagnosis: same


Procedure: 





D&C


Anesthesia: MAC


Findings: 





A


Surgeon: BALA REVELES


Estimated blood loss: 50-100ml


Pathology: list (POC)


Specimen disposition: to lab


Condition: stable





- Hospital course


Hospital course: 





Pt was hospitalized and received IV antiemetic, Pepcid and IV steroids without 

any significant improvement of her symptoms. A follow up pelvic u/s showed an 

IUFD @ 13 weeks.  She therefore underwent an uncomplicated D&C after which her 

symptoms resolved completely.  Post operative course was unremarkable, and she 

was discharged to home on POD #1 in stable condition.





- Disposition


Condition at discharge: Good


Disposition: DC-01 TO HOME OR SELFCARE





- Discharge Diagnoses


(1) S/P D&C (status post dilation and curettage)


Status: Resolved   





(2) Hyperemesis arising during pregnancy


Status: Resolved   





(3) Dehydration


Status: Resolved   





(4) Hypokalemia


Status: Resolved   





(5) Hyponatremia


Status: Resolved   





Short Stay Discharge Plan


Activity: no restrictions


Diet: regular


Follow up with: 


ROBERTO CARLOS MAYORGA MD [Primary Care Provider] - 7 Days


BALA REVELES MD [Staff Physician] - 14 Days


Prescriptions: 


Methylergonovine [Methergine] 0.2 mg PO Q8HR #6 tablet


oxyCODONE /ACETAMINOPHEN [Percocet 5/325 mg] 1 tab PO Q6HR PRN #20 tablet


 PRN Reason: Pain, Moderate (4-6)

## 2018-04-26 LAB
BILIRUB UR QL STRIP: (no result)
BLOOD UR QL VISUAL: (no result)
HCT VFR BLD CALC: 34.9 % (ref 30.3–42.9)
HGB BLD-MCNC: 12.1 GM/DL (ref 10.1–14.3)
MCH RBC QN AUTO: 28 PG (ref 28–32)
MCHC RBC AUTO-ENTMCNC: 35 % (ref 30–34)
MCV RBC AUTO: 79 FL (ref 79–97)
MUCOUS THREADS #/AREA URNS HPF: (no result) /HPF
PH UR STRIP: 7 [PH] (ref 5–7)
PLATELET # BLD: 311 K/MM3 (ref 140–440)
RBC # BLD AUTO: 4.4 M/MM3 (ref 3.65–5.03)
RBC #/AREA URNS HPF: 4 /HPF (ref 0–6)
UROBILINOGEN UR-MCNC: 2 MG/DL (ref ?–2)
WBC #/AREA URNS HPF: 5 /HPF (ref 0–6)

## 2018-04-26 RX ADMIN — PROMETHAZINE HYDROCHLORIDE PRN MG: 25 SUPPOSITORY RECTAL at 13:20

## 2018-04-26 RX ADMIN — FAMOTIDINE SCH MG: 20 TABLET ORAL at 22:45

## 2018-04-26 RX ADMIN — ONDANSETRON PRN MG: 2 INJECTION INTRAMUSCULAR; INTRAVENOUS at 04:15

## 2018-04-26 RX ADMIN — Medication SCH: at 10:07

## 2018-04-26 RX ADMIN — POTASSIUM CHLORIDE SCH MLS/HR: 10 INJECTION, SOLUTION INTRAVENOUS at 02:03

## 2018-04-26 RX ADMIN — DEXTROSE, SODIUM CHLORIDE, SODIUM LACTATE, POTASSIUM CHLORIDE, AND CALCIUM CHLORIDE SCH MLS/HR: 5; .6; .31; .03; .02 INJECTION, SOLUTION INTRAVENOUS at 20:12

## 2018-04-26 RX ADMIN — DEXTROSE, SODIUM CHLORIDE, SODIUM LACTATE, POTASSIUM CHLORIDE, AND CALCIUM CHLORIDE SCH MLS/HR: 5; .6; .31; .03; .02 INJECTION, SOLUTION INTRAVENOUS at 00:42

## 2018-04-26 RX ADMIN — POTASSIUM CHLORIDE SCH MLS/HR: 10 INJECTION, SOLUTION INTRAVENOUS at 00:42

## 2018-04-26 RX ADMIN — ONDANSETRON PRN MG: 2 INJECTION INTRAMUSCULAR; INTRAVENOUS at 10:15

## 2018-04-26 RX ADMIN — ONDANSETRON PRN MG: 2 INJECTION INTRAMUSCULAR; INTRAVENOUS at 20:12

## 2018-04-26 RX ADMIN — POTASSIUM CHLORIDE SCH MLS/HR: 10 INJECTION, SOLUTION INTRAVENOUS at 04:15

## 2018-04-26 NOTE — PROGRESS NOTE
Assessment and Plan





- Patient Problems


(1) 12 weeks gestation of pregnancy


Onset Date: 04/25/18   Current Visit: Yes   Status: Acute   


Plan to address problem: 


A:  IUP @ 12 weeks


      Hyperemesis gravidarum


      Hypokalemia


      Hyponatremia


      Dehydration





 P:  Continue with present management


      Obtain Home Health care consultation


      Replete electrolytes








(2) Hyponatremia


Onset Date: 04/25/18   Current Visit: No   Status: Acute   





(3) Dehydration


Onset Date: 04/25/18   Current Visit: No   Status: Resolved   





(4) Hyperemesis gravidarum


Onset Date: 04/25/18   Current Visit: No   Status: Resolved   





(5) Hypokalemia


Onset Date: 04/25/18   Current Visit: No   Status: Resolved   





Subjective





- Subjective


Date of service: 04/26/18


Principal diagnosis: Hyperemesis gravidarum;  Hypokalemia; Hyponatremia


Interval history: 





Pt states she is still vomiting despite IV Zofran.


Patient reports: new complaints (Heartburn), no loss of fluid, no vaginal 

bleeding





Objective





- Vital Signs


Vital Signs: 


 Vital Signs - 12hr











  04/26/18 04/26/18 04/26/18





  12:08 16:41 20:17


 


Temperature 98.7 F 98.7 F 99.3 F


 


Pulse Rate 86 83 86


 


Respiratory 16 16 18





Rate   


 


Blood Pressure 111/76 107/69 99/56


 


O2 Sat by Pulse 99 98 99





Oximetry   














- Exam


Cardiovascular: Regular rate


Lungs: Clear to auscultation


Abdomen: Present: normal appearance, soft





- Labs


Labs: 


 Abnormal Labs











  04/25/18 04/25/18 04/26/18





  19:57 19:57 07:06


 


MCHC    35 H


 


Sodium   135 L 


 


Potassium   3.0 L D 


 


Chloride   93.5 L 


 


Creatinine   0.5 L 


 


Glucose   115 H 


 


Lipase  63 H  


 


U Epithel Cells (Auto)   














  04/26/18





  13:38


 


MCHC 


 


Sodium 


 


Potassium 


 


Chloride 


 


Creatinine 


 


Glucose 


 


Lipase 


 


U Epithel Cells (Auto)  17.0 H








 Laboratory Results - last 24 hr











  04/26/18 04/26/18





  07:06 13:38


 


WBC  8.8 


 


RBC  4.40 


 


Hgb  12.1 


 


Hct  34.9 


 


MCV  79 


 


MCH  28 


 


MCHC  35 H 


 


RDW  14.1 


 


Plt Count  311 


 


Urine Color   Rosi


 


Urine Turbidity   Clear


 


Urine pH   7.0


 


Ur Specific Gravity   1.029


 


Urine Protein   100 mg/dl


 


Urine Glucose (UA)   150


 


Urine Ketones   20


 


Urine Blood   Neg


 


Urine Nitrite   Neg


 


Urine Bilirubin   Neg


 


Urine Urobilinogen   2.0


 


Ur Leukocyte Esterase   Mod


 


Urine WBC (Auto)   5.0


 


Urine RBC (Auto)   4.0


 


U Epithel Cells (Auto)   17.0 H


 


Urine Mucus   3+

## 2018-04-27 LAB
BUN SERPL-MCNC: < 1 MG/DL (ref 7–17)
BUN/CREAT SERPL: 3 %
CALCIUM SERPL-MCNC: 8.4 MG/DL (ref 8.4–10.2)
HEMOLYSIS INDEX: 8

## 2018-04-27 RX ADMIN — FAMOTIDINE SCH MG: 20 TABLET ORAL at 09:45

## 2018-04-27 RX ADMIN — ONDANSETRON PRN MG: 2 INJECTION INTRAMUSCULAR; INTRAVENOUS at 08:00

## 2018-04-27 RX ADMIN — POTASSIUM CHLORIDE SCH MLS/HR: 10 INJECTION, SOLUTION INTRAVENOUS at 21:32

## 2018-04-27 RX ADMIN — FAMOTIDINE SCH MG: 20 TABLET ORAL at 23:34

## 2018-04-27 RX ADMIN — DEXTROSE, SODIUM CHLORIDE, SODIUM LACTATE, POTASSIUM CHLORIDE, AND CALCIUM CHLORIDE SCH MLS/HR: 5; .6; .31; .03; .02 INJECTION, SOLUTION INTRAVENOUS at 17:20

## 2018-04-27 RX ADMIN — DEXTROSE, SODIUM CHLORIDE, SODIUM LACTATE, POTASSIUM CHLORIDE, AND CALCIUM CHLORIDE SCH MLS/HR: 5; .6; .31; .03; .02 INJECTION, SOLUTION INTRAVENOUS at 09:45

## 2018-04-27 RX ADMIN — POTASSIUM CHLORIDE SCH MLS/HR: 10 INJECTION, SOLUTION INTRAVENOUS at 23:33

## 2018-04-27 RX ADMIN — Medication SCH: at 10:41

## 2018-04-27 RX ADMIN — ONDANSETRON PRN MG: 2 INJECTION INTRAMUSCULAR; INTRAVENOUS at 23:34

## 2018-04-27 RX ADMIN — ONDANSETRON PRN MG: 2 INJECTION INTRAMUSCULAR; INTRAVENOUS at 15:00

## 2018-04-27 RX ADMIN — DEXTROSE, SODIUM CHLORIDE, SODIUM LACTATE, POTASSIUM CHLORIDE, AND CALCIUM CHLORIDE SCH MLS/HR: 5; .6; .31; .03; .02 INJECTION, SOLUTION INTRAVENOUS at 02:55

## 2018-04-27 NOTE — PROGRESS NOTE
Assessment and Plan





- Patient Problems


(1) 12 weeks gestation of pregnancy


Onset Date: 04/25/18   Current Visit: Yes   Status: Acute   


Plan to address problem: 


A:  IUP @ 12 weeks


      Hyperemesis gravidarum


      Hypokalemia


      Hyponatremia


      Dehydration - improving





 P:  Continue with present management


      Awaiting Home Health care consultation


      Replete electrolytes - Will obtain BMP








(2) Hyponatremia


Onset Date: 04/25/18   Current Visit: No   Status: Acute   





(3) Dehydration


Onset Date: 04/25/18   Current Visit: No   Status: Resolved   





(4) Hyperemesis gravidarum


Onset Date: 04/25/18   Current Visit: No   Status: Resolved   





(5) Hypokalemia


Onset Date: 04/25/18   Current Visit: No   Status: Resolved   





Subjective





- Subjective


Date of service: 04/27/18


Principal diagnosis: Hyperemesis gravidarum;  Hypokalemia; Hyponatremia


Interval history: 





Pt states she is still vomiting despite IV Zofran. VT Phenergan is helping. 


Patient reports: new complaints (Heartburn), no loss of fluid, no vaginal 

bleeding





Objective





- Vital Signs


Vital Signs: 


 Vital Signs - 12hr











  04/27/18 04/27/18 04/27/18





  04:16 08:33 12:32


 


Temperature 98.6 F 98.1 F 98.7 F


 


Pulse Rate 87 80 86


 


Respiratory 16 16 16





Rate   


 


Blood Pressure 104/63 106/69 100/54


 


O2 Sat by Pulse 99 99 99





Oximetry   














- Exam


Cardiovascular: Regular rate


Lungs: Clear to auscultation


Abdomen: Present: normal appearance, soft





- Labs


Labs: 


 Abnormal Labs











  04/25/18 04/25/18 04/26/18





  19:57 19:57 07:06


 


MCHC    35 H


 


Sodium   135 L 


 


Potassium   3.0 L D 


 


Chloride   93.5 L 


 


Creatinine   0.5 L 


 


Glucose   115 H 


 


Lipase  63 H  


 


U Epithel Cells (Auto)   














  04/26/18





  13:38


 


MCHC 


 


Sodium 


 


Potassium 


 


Chloride 


 


Creatinine 


 


Glucose 


 


Lipase 


 


U Epithel Cells (Auto)  17.0 H

## 2018-04-28 LAB
BUN SERPL-MCNC: < 1 MG/DL (ref 7–17)
BUN/CREAT SERPL: 3 %
CALCIUM SERPL-MCNC: 8.5 MG/DL (ref 8.4–10.2)
HEMOLYSIS INDEX: 42

## 2018-04-28 RX ADMIN — DEXTROSE, SODIUM CHLORIDE, SODIUM LACTATE, POTASSIUM CHLORIDE, AND CALCIUM CHLORIDE SCH MLS/HR: 5; .6; .31; .03; .02 INJECTION, SOLUTION INTRAVENOUS at 21:40

## 2018-04-28 RX ADMIN — PROMETHAZINE HYDROCHLORIDE PRN MG: 25 SUPPOSITORY RECTAL at 12:34

## 2018-04-28 RX ADMIN — ONDANSETRON PRN MG: 2 INJECTION INTRAMUSCULAR; INTRAVENOUS at 08:38

## 2018-04-28 RX ADMIN — Medication SCH: at 10:38

## 2018-04-28 RX ADMIN — ONDANSETRON PRN MG: 2 INJECTION INTRAMUSCULAR; INTRAVENOUS at 21:32

## 2018-04-28 RX ADMIN — PROMETHAZINE HYDROCHLORIDE PRN MG: 25 SUPPOSITORY RECTAL at 07:02

## 2018-04-28 RX ADMIN — DEXTROSE, SODIUM CHLORIDE, SODIUM LACTATE, POTASSIUM CHLORIDE, AND CALCIUM CHLORIDE SCH MLS/HR: 5; .6; .31; .03; .02 INJECTION, SOLUTION INTRAVENOUS at 14:49

## 2018-04-28 RX ADMIN — FAMOTIDINE SCH MG: 20 TABLET ORAL at 10:37

## 2018-04-28 RX ADMIN — DEXTROSE, SODIUM CHLORIDE, SODIUM LACTATE, POTASSIUM CHLORIDE, AND CALCIUM CHLORIDE SCH MLS/HR: 5; .6; .31; .03; .02 INJECTION, SOLUTION INTRAVENOUS at 08:39

## 2018-04-28 RX ADMIN — PROMETHAZINE HYDROCHLORIDE PRN MG: 25 SUPPOSITORY RECTAL at 18:30

## 2018-04-28 RX ADMIN — ONDANSETRON PRN MG: 2 INJECTION INTRAMUSCULAR; INTRAVENOUS at 14:40

## 2018-04-28 RX ADMIN — FAMOTIDINE SCH MG: 20 TABLET ORAL at 21:32

## 2018-04-28 NOTE — PROGRESS NOTE
Assessment and Plan


A:  IUP @ ~ 12 weeks


      Hyperemesis gravidarum


      Hypokalemia- improved


      Hyponatremia-improved


      Dehydration - improving





 P:  Continue with present management


      Awaiting Home Health care consultation


     














- Patient Problems


(1) 12 weeks gestation of pregnancy


Onset Date: 04/25/18   Current Visit: Yes   Status: Acute   





Subjective





- Subjective


Date of service: 04/28/18


Principal diagnosis: Hyperemesis gravidarum;  Hypokalemia; Hyponatremia


Interval history: 


Patient seen and examined, stable doing well.  Still nauseous but electrolytes 

have improved





Patient reports: new complaints (Heartburn), no loss of fluid, no vaginal 

bleeding, no contractions





Objective





- Vital Signs


Vital Signs: 


 Vital Signs - 12hr











  04/28/18 04/28/18 04/28/18





  00:41 05:10 08:37


 


Temperature 98.7 F 98.8 F 98.5 F


 


Pulse Rate 76 81 85


 


Respiratory 18 18 14





Rate   


 


Blood Pressure   115/69


 


Blood Pressure 129/80 103/70 





[Right]   


 


O2 Sat by Pulse   99





Oximetry   














- Exam


Abdomen: Present: normal appearance, soft.  Absent: distention, tenderness, 

guarding, rigidity





- Labs


Labs: 


 Abnormal Labs











  04/25/18 04/25/18 04/26/18





  19:57 19:57 07:06


 


MCHC    35 H


 


Sodium   135 L 


 


Potassium   3.0 L D 


 


Chloride   93.5 L 


 


BUN   


 


Creatinine   0.5 L 


 


Glucose   115 H 


 


Lipase  63 H  


 


U Epithel Cells (Auto)   














  04/26/18 04/27/18 04/28/18





  13:38 19:12 08:07


 


MCHC   


 


Sodium   134 L 


 


Potassium   3.0 L  3.5 L


 


Chloride   


 


BUN   < 1 L  < 1 L


 


Creatinine   0.4 L  0.4 L


 


Glucose   


 


Lipase   


 


U Epithel Cells (Auto)  17.0 H  








 Laboratory Results - last 24 hr











  04/27/18 04/28/18





  19:12 08:07


 


Sodium  134 L  138


 


Potassium  3.0 L  3.5 L


 


Chloride  99.2  102.9


 


Carbon Dioxide  23  22


 


Anion Gap  15  17


 


BUN  < 1 L  < 1 L


 


Creatinine  0.4 L  0.4 L


 


Estimated GFR  > 60  > 60


 


BUN/Creatinine Ratio  3  3


 


Glucose  95  92


 


Calcium  8.4  8.5

## 2018-04-29 RX ADMIN — ONDANSETRON SCH MG: 2 INJECTION INTRAMUSCULAR; INTRAVENOUS at 16:56

## 2018-04-29 RX ADMIN — OXYCODONE AND ACETAMINOPHEN PRN TAB: 5; 325 TABLET ORAL at 16:58

## 2018-04-29 RX ADMIN — FAMOTIDINE SCH MG: 20 TABLET ORAL at 09:35

## 2018-04-29 RX ADMIN — PROMETHAZINE HYDROCHLORIDE PRN MG: 25 SUPPOSITORY RECTAL at 00:29

## 2018-04-29 RX ADMIN — FAMOTIDINE SCH MG: 20 TABLET ORAL at 21:56

## 2018-04-29 RX ADMIN — DEXTROSE, SODIUM CHLORIDE, SODIUM LACTATE, POTASSIUM CHLORIDE, AND CALCIUM CHLORIDE SCH MLS/HR: 5; .6; .31; .03; .02 INJECTION, SOLUTION INTRAVENOUS at 04:45

## 2018-04-29 RX ADMIN — DEXTROSE, SODIUM CHLORIDE, SODIUM LACTATE, POTASSIUM CHLORIDE, AND CALCIUM CHLORIDE SCH MLS/HR: 5; .6; .31; .03; .02 INJECTION, SOLUTION INTRAVENOUS at 09:36

## 2018-04-29 RX ADMIN — Medication SCH EACH: at 09:35

## 2018-04-29 RX ADMIN — FAMOTIDINE SCH: 20 TABLET ORAL at 22:00

## 2018-04-29 RX ADMIN — PROMETHAZINE HYDROCHLORIDE PRN MG: 25 SUPPOSITORY RECTAL at 21:56

## 2018-04-29 RX ADMIN — ONDANSETRON PRN MG: 2 INJECTION INTRAMUSCULAR; INTRAVENOUS at 04:09

## 2018-04-29 RX ADMIN — ONDANSETRON PRN MG: 2 INJECTION INTRAMUSCULAR; INTRAVENOUS at 09:36

## 2018-04-29 RX ADMIN — DEXTROSE, SODIUM CHLORIDE, SODIUM LACTATE, POTASSIUM CHLORIDE, AND CALCIUM CHLORIDE SCH MLS/HR: 5; .6; .31; .03; .02 INJECTION, SOLUTION INTRAVENOUS at 17:04

## 2018-04-29 RX ADMIN — DEXTROSE, SODIUM CHLORIDE, SODIUM LACTATE, POTASSIUM CHLORIDE, AND CALCIUM CHLORIDE SCH MLS/HR: 5; .6; .31; .03; .02 INJECTION, SOLUTION INTRAVENOUS at 17:03

## 2018-04-29 NOTE — PROGRESS NOTE
Assessment and Plan


A:  IUP @ ~ 12+4 weeks


      Hyperemesis gravidarum


      Hypokalemia- improved


      Hyponatremia-improved


      Dehydration - improving





 P:  


-Will increase Zofran to 8 mg every 8 schedule


-Advised patient to accept Phenergan suppositories


-Repeat electrolytes tomorrow morning


-Awaiting Home Health care consultation


     














- Patient Problems


(1) 12 weeks gestation of pregnancy


Onset Date: 04/25/18   Current Visit: Yes   Status: Acute   





Subjective





- Subjective


Date of service: 04/29/18


Principal diagnosis: Hyperemesis gravidarum;  Hypokalemia; Hyponatremia


Interval history: 


Patient seen and examined, apparently had some more vomiting yesterday.  She 

declined her Phenergan suppository and has not been receiving her Zofran 

scheduled.  She also appears to have some ptyalism





Patient reports: new complaints (Heartburn), no loss of fluid, no vaginal 

bleeding, no contractions





Objective





- Vital Signs


Vital Signs: 


 Vital Signs - 12hr











  04/29/18 04/29/18 04/29/18





  00:20 04:57 09:42


 


Temperature 99 F 97.6 F 98.9 F


 


Pulse Rate 78 97 H 81


 


Respiratory 18 16 18





Rate   


 


Blood Pressure   125/81


 


Blood Pressure 108/68 138/83 





[Right]   


 


O2 Sat by Pulse   100





Oximetry   














- Labs


Labs: 


 Abnormal Labs











  04/25/18 04/25/18 04/26/18





  19:57 19:57 07:06


 


MCHC    35 H


 


Sodium   135 L 


 


Potassium   3.0 L D 


 


Chloride   93.5 L 


 


BUN   


 


Creatinine   0.5 L 


 


Glucose   115 H 


 


Lipase  63 H  


 


U Epithel Cells (Auto)   














  04/26/18 04/27/18 04/28/18





  13:38 19:12 08:07


 


MCHC   


 


Sodium   134 L 


 


Potassium   3.0 L  3.5 L


 


Chloride   


 


BUN   < 1 L  < 1 L


 


Creatinine   0.4 L  0.4 L


 


Glucose   


 


Lipase   


 


U Epithel Cells (Auto)  17.0 H

## 2018-04-30 LAB
ALBUMIN SERPL-MCNC: 3.1 G/DL (ref 3.9–5)
ALT SERPL-CCNC: 8 UNITS/L (ref 7–56)
BILIRUB DIRECT SERPL-MCNC: 0.2 MG/DL (ref 0–0.2)
BUN SERPL-MCNC: < 1 MG/DL (ref 7–17)
BUN/CREAT SERPL: 3 %
CALCIUM SERPL-MCNC: 8.4 MG/DL (ref 8.4–10.2)
HEMOLYSIS INDEX: 5
LIPASE SERPL-CCNC: 122 UNITS/L (ref 13–60)

## 2018-04-30 RX ADMIN — PROMETHAZINE HYDROCHLORIDE PRN MG: 25 SUPPOSITORY RECTAL at 11:17

## 2018-04-30 RX ADMIN — PROMETHAZINE HYDROCHLORIDE PRN MG: 25 SUPPOSITORY RECTAL at 04:25

## 2018-04-30 RX ADMIN — FAMOTIDINE SCH MG: 20 TABLET ORAL at 22:28

## 2018-04-30 RX ADMIN — DEXTROSE, SODIUM CHLORIDE, SODIUM LACTATE, POTASSIUM CHLORIDE, AND CALCIUM CHLORIDE SCH MLS/HR: 5; .6; .31; .03; .02 INJECTION, SOLUTION INTRAVENOUS at 04:25

## 2018-04-30 RX ADMIN — ONDANSETRON SCH MG: 2 INJECTION INTRAMUSCULAR; INTRAVENOUS at 08:53

## 2018-04-30 RX ADMIN — SUCRALFATE SCH: 1 TABLET ORAL at 14:20

## 2018-04-30 RX ADMIN — DEXTROSE, SODIUM CHLORIDE, SODIUM LACTATE, POTASSIUM CHLORIDE, AND CALCIUM CHLORIDE SCH MLS/HR: 5; .6; .31; .03; .02 INJECTION, SOLUTION INTRAVENOUS at 00:43

## 2018-04-30 RX ADMIN — SUCRALFATE SCH GM: 1 TABLET ORAL at 22:28

## 2018-04-30 RX ADMIN — PROMETHAZINE HYDROCHLORIDE PRN MG: 25 SUPPOSITORY RECTAL at 19:25

## 2018-04-30 RX ADMIN — Medication SCH: at 11:15

## 2018-04-30 RX ADMIN — ONDANSETRON SCH MG: 2 INJECTION INTRAMUSCULAR; INTRAVENOUS at 19:25

## 2018-04-30 RX ADMIN — ONDANSETRON SCH MG: 2 INJECTION INTRAMUSCULAR; INTRAVENOUS at 00:43

## 2018-04-30 RX ADMIN — FAMOTIDINE SCH MG: 20 TABLET ORAL at 11:17

## 2018-04-30 NOTE — ULTRASOUND REPORT
ULTRASOUND ABDOMEN COMPLETE:



TECHNIQUE:  Transabdominal ultrasound with color Doppler interrogation.



HISTORY: Persistent nausea and vomiting.



COMPARISON: none.







FINDINGS:



LIVER: Normal.



BILIARY SYSTEM: Normal.



PANCREAS: Normal.



SPLEEN: Normal.



KIDNEYS: Normal.



AORTA/IVC: Normal.



ASCITES: None.







IMPRESSION:

Unremarkable exam.

## 2018-04-30 NOTE — PROGRESS NOTE
Assessment and Plan





- Patient Problems


(1) 12 weeks gestation of pregnancy


Onset Date: 04/25/18   Current Visit: Yes   Status: Acute   


Plan to address problem: 


A:  IUP @ 12 weeks


      Hyperemesis gravidarum


      Hypokalemia - resolved


      Hyponatremia - resolved


      Dehydration - improving





 P:  Continue with present management


      Awaiting Home Health care consultation


      Obtain a GI consultation








(2) Hyponatremia


Onset Date: 04/25/18   Current Visit: No   Status: Acute   





(3) Dehydration


Onset Date: 04/25/18   Current Visit: No   Status: Resolved   





(4) Hyperemesis gravidarum


Onset Date: 04/25/18   Current Visit: No   Status: Resolved   





(5) Hypokalemia


Onset Date: 04/25/18   Current Visit: No   Status: Resolved   





Subjective





- Subjective


Date of service: 04/30/18


Principal diagnosis: Hyperemesis gravidarum;  Hypokalemia; Hyponatremia


Interval history: 





Pt states she is still vomiting despite IV Zofran. LA Phenergan is helping. 


Patient reports: new complaints (Heartburn), no loss of fluid, no vaginal 

bleeding, no contractions





Objective





- Vital Signs


Vital Signs: 


 Vital Signs - 12hr











  04/30/18 04/30/18





  00:00 04:20


 


Temperature 98.5 F 99.0 F


 


Pulse Rate 73 87


 


Respiratory 18 18





Rate  


 


Blood Pressure 101/58 128/76





[Right]  














- Exam


Cardiovascular: Regular rate


Abdomen: Present: normal appearance, soft





- Labs


Labs: 


 Abnormal Labs











  04/25/18 04/25/18 04/26/18





  19:57 19:57 07:06


 


MCHC    35 H


 


Sodium   135 L 


 


Potassium   3.0 L D 


 


Chloride   93.5 L 


 


BUN   


 


Creatinine   0.5 L 


 


Glucose   115 H 


 


Lipase  63 H  


 


U Epithel Cells (Auto)   














  04/26/18 04/27/18 04/28/18





  13:38 19:12 08:07


 


MCHC   


 


Sodium   134 L 


 


Potassium   3.0 L  3.5 L


 


Chloride   


 


BUN   < 1 L  < 1 L


 


Creatinine   0.4 L  0.4 L


 


Glucose   


 


Lipase   


 


U Epithel Cells (Auto)  17.0 H

## 2018-04-30 NOTE — GASTROENTEROLOGY CONSULTATION
<MAKAYLA ASCENCIO - Last Filed: 04/30/18 10:31>





History of Present Illness





- Reason for Consult


Consult date: 04/30/18


persistent nausea and vomiting


Requesting physician: BALA REVELES





- History of Present Illness


Patient is a 31 y/o female with no significant PMH who is 12 weeks gestation. 

She was admitted for persistent N/V and dehydration. This morning pt was 

resting in bed w/o acute distress. Reports N/V x 2 months with approximately 11-

12 episodes daily with non-bloody bilious emesis. Unable to tolerate PO intake. 

Currently w/o abd pain but states she has had some intermittent lower abd 

cramping. Admits to recent wt loss due to poor PO intake, along with mild 

heartburn and throat irritation associated with N/V but denies fever, CP, SOB, 

dizziness, dysphagia, odynophagia, signs of bleeding, or LGI symptoms such as 

diarrhea or constipation. No NSAID use. No hx of PUD or previous EGD.








Past History


Past Surgical History: No surgical history


Social history: no significant social history, single


Family history: no significant family history





Medications and Allergies


 Allergies











Allergy/AdvReac Type Severity Reaction Status Date / Time


 


ibuprofen [From Motrin] AdvReac  Headache Verified 11/23/15 11:25


 


metoclopramide [From Reglan] AdvReac  Dizziness Verified 04/21/18 10:37











 Home Medications











 Medication  Instructions  Recorded  Confirmed  Last Taken  Type


 


Ondansetron [Zofran] 4 mg PO Q6HR PRN #20 tablet 04/09/14 04/26/18 1 Week Ago Rx





    ~04/19/18 


 


No Known Home Medications [No  10/05/16 04/26/18 Unknown History





Reported Home Medications]     











Active Meds: 


Active Medications





Famotidine (Pepcid)  20 mg PO BID [facility]


   Last Admin: 04/29/18 22:00 Dose:  Not Given


Dextrose/Lactated Ringer's (D5lr)  1,000 mls @ 150 mls/hr IV AS DIRECT [facility]


   Last Admin: 04/30/18 04:25 Dose:  150 mls/hr


Multivitamins/Iron/Calcium (Prenatal Vitamin)  1 each PO QDAY [facility]


   Last Admin: 04/29/18 09:35 Dose:  1 each


Ondansetron HCl (Zofran)  4 mg IV Q6H PRN


   PRN Reason: N/V unrelieved by Reglan


   Last Admin: 04/29/18 09:36 Dose:  4 mg


Ondansetron HCl (Zofran)  8 mg IV Q8HR [facility]


   Last Admin: 04/30/18 08:53 Dose:  8 mg


Oxycodone/Acetaminophen (Percocet 5/325)  1 tab PO Q4H PRN


   PRN Reason: Pain, Moderate (4-6)


Oxycodone/Acetaminophen (Percocet 5/325)  2 tab PO Q6H PRN


   PRN Reason: Pain , Severe (7-10)


   Last Admin: 04/29/18 16:58 Dose:  2 tab


Promethazine HCl (Phenergan)  25 mg ND Q6H PRN


   PRN Reason: Nausea And Vomiting


   Last Admin: 04/30/18 04:25 Dose:  25 mg











Review of Systems





- Review of Systems


All systems: negative


Gastrointestinal: nausea, vomiting, heartburn





Exam





- Constitutional


Vital Signs: 


 











Temp Pulse Resp BP Pulse Ox


 


 98.3 F   78   18   111/75   98 


 


 04/30/18 07:50  04/30/18 07:50  04/30/18 07:50  04/30/18 07:50  04/30/18 07:50











General appearance: no acute distress





- EENT


Eyes: PERRL, EOM intact


ENT: hearing intact





- Respiratory


Respiratory: bilateral: CTA





- Cardiovascular


Rhythm: regular


Heart Sounds: Present: S1 & S2





- Gastrointestinal


General gastrointestinal: Present: soft, non-tender, non-distended, normal 

bowel sounds





- Neurologic


Neurological: alert and oriented x3





- Labs


CBC & Chem 7: 


 04/26/18 07:06





 04/30/18 08:19


Lab Results: 


 Laboratory Results - last 24 hr











  04/30/18





  08:19


 


Sodium  135 L


 


Potassium  3.2 L


 


Chloride  100.1


 


Carbon Dioxide  24


 


Anion Gap  14


 


BUN  < 1 L


 


Creatinine  0.4 L


 


Estimated GFR  > 60


 


BUN/Creatinine Ratio  3


 


Glucose  107 H


 


Calcium  8.4














Assessment and Plan


1.intractable N/V


-etiology-most likely due to hyperemesis gravidarum


-will order abd U/S to r/o biliary process


-CMP in am


-continue pepcid


-start on carafate


-clear liquids, advance as tolerated


-continue antiemetics and supportive care


-electrolyte management per primary team


-further recommendations to follow








<REYEZ,PENNY A - Last Filed: 04/30/18 21:23>





Medications and Allergies


Active Meds: 


Active Medications





Famotidine (Pepcid)  20 mg PO BID [facility]


   Last Admin: 04/30/18 11:17 Dose:  20 mg


Dextrose/Lactated Ringer's (D5lr)  1,000 mls @ 150 mls/hr IV AS DIRECT [facility]


   Last Admin: 04/30/18 04:25 Dose:  150 mls/hr


Multivitamins/Iron/Calcium (Prenatal Vitamin)  1 each PO QDAY [facility]


   Last Admin: 04/30/18 11:15 Dose:  Not Given


Ondansetron HCl (Zofran)  4 mg IV Q6H PRN


   PRN Reason: N/V unrelieved by [facility]


   Last Admin: 04/29/18 09:36 Dose:  4 mg


Ondansetron HCl (Zofran)  8 mg IV Q8HR [facility]


   Last Admin: 04/30/18 19:25 Dose:  8 mg


Oxycodone/Acetaminophen (Percocet 5/325)  1 tab PO Q4H PRN


   PRN Reason: Pain, Moderate (4-6)


Oxycodone/Acetaminophen (Percocet 5/325)  2 tab PO Q6H PRN


   PRN Reason: Pain , Severe (7-10)


   Last Admin: 04/29/18 16:58 Dose:  2 tab


Promethazine HCl (Phenergan)  25 mg ND Q6H PRN


   PRN Reason: Nausea And Vomiting


   Last Admin: 04/30/18 19:25 Dose:  25 mg


Sucralfate (Carafate)  1 gm PO Q6HR [facility]


   Last Admin: 04/30/18 14:20 Dose:  Not Given











Exam





- Constitutional


Vital Signs: 


 











Temp Pulse Resp BP Pulse Ox


 


 98.7 F   79   18   111/64   95 


 


 04/30/18 16:34  04/30/18 16:34  04/30/18 16:34  04/30/18 16:34  04/30/18 16:34














- Labs


CBC & Chem 7: 


 04/26/18 07:06





 04/30/18 08:19


Lab Results: 


 Laboratory Results - last 24 hr











  04/30/18 04/30/18





  08:19 17:49


 


Sodium  135 L 


 


Potassium  3.2 L 


 


Chloride  100.1 


 


Carbon Dioxide  24 


 


Anion Gap  14 


 


BUN  < 1 L 


 


Creatinine  0.4 L 


 


Estimated GFR  > 60 


 


BUN/Creatinine Ratio  3 


 


Glucose  107 H 


 


Calcium  8.4 


 


Total Bilirubin   0.30


 


Direct Bilirubin   0.2


 


Indirect Bilirubin   0.1


 


AST   11


 


ALT   8


 


Alkaline Phosphatase   54


 


Total Protein   5.5 L


 


Albumin   3.1 L


 


Albumin/Globulin Ratio   1.3


 


Lipase   122 H














Assessment and Plan


Patient seen and examined.  Agree with note above.  RUQ US negative.  will 

check lipase and liver enzymes.  Cont supportive care per OB.  Consider PPI as 

pt is past 10-12 weeks gestation and if symptoms persists.

## 2018-05-01 LAB
ALBUMIN SERPL-MCNC: 3.2 G/DL (ref 3.9–5)
ALT SERPL-CCNC: 8 UNITS/L (ref 7–56)
BUN SERPL-MCNC: 1 MG/DL (ref 7–17)
BUN/CREAT SERPL: 3 %
CALCIUM SERPL-MCNC: 8.6 MG/DL (ref 8.4–10.2)
HEMOLYSIS INDEX: 2

## 2018-05-01 RX ADMIN — POTASSIUM CHLORIDE SCH MLS/HR: 10 INJECTION, SOLUTION INTRAVENOUS at 11:35

## 2018-05-01 RX ADMIN — Medication SCH: at 10:00

## 2018-05-01 RX ADMIN — POTASSIUM CHLORIDE SCH MLS/HR: 10 INJECTION, SOLUTION INTRAVENOUS at 12:56

## 2018-05-01 RX ADMIN — SUCRALFATE SCH GM: 1 TABLET ORAL at 06:48

## 2018-05-01 RX ADMIN — SUCRALFATE SCH GM: 1 TABLET ORAL at 18:05

## 2018-05-01 RX ADMIN — PANTOPRAZOLE SODIUM SCH MG: 40 INJECTION, POWDER, FOR SOLUTION INTRAVENOUS at 14:28

## 2018-05-01 RX ADMIN — FAMOTIDINE SCH MG: 10 INJECTION, SOLUTION INTRAVENOUS at 06:48

## 2018-05-01 RX ADMIN — ONDANSETRON SCH MG: 2 INJECTION INTRAMUSCULAR; INTRAVENOUS at 14:26

## 2018-05-01 RX ADMIN — DEXTROSE, SODIUM CHLORIDE, SODIUM LACTATE, POTASSIUM CHLORIDE, AND CALCIUM CHLORIDE SCH MLS/HR: 5; .6; .31; .03; .02 INJECTION, SOLUTION INTRAVENOUS at 14:23

## 2018-05-01 RX ADMIN — SUCRALFATE SCH GM: 1 TABLET ORAL at 12:00

## 2018-05-01 RX ADMIN — DEXTROSE, SODIUM CHLORIDE, SODIUM LACTATE, POTASSIUM CHLORIDE, AND CALCIUM CHLORIDE SCH MLS/HR: 5; .6; .31; .03; .02 INJECTION, SOLUTION INTRAVENOUS at 21:07

## 2018-05-01 RX ADMIN — POTASSIUM CHLORIDE SCH MLS/HR: 10 INJECTION, SOLUTION INTRAVENOUS at 10:34

## 2018-05-01 RX ADMIN — DEXTROSE, SODIUM CHLORIDE, SODIUM LACTATE, POTASSIUM CHLORIDE, AND CALCIUM CHLORIDE SCH MLS/HR: 5; .6; .31; .03; .02 INJECTION, SOLUTION INTRAVENOUS at 06:53

## 2018-05-01 RX ADMIN — FAMOTIDINE SCH MG: 10 INJECTION, SOLUTION INTRAVENOUS at 09:30

## 2018-05-01 RX ADMIN — PROMETHAZINE HYDROCHLORIDE PRN MG: 25 SUPPOSITORY RECTAL at 04:44

## 2018-05-01 RX ADMIN — OXYCODONE AND ACETAMINOPHEN PRN TAB: 5; 325 TABLET ORAL at 18:05

## 2018-05-01 RX ADMIN — PROMETHAZINE HYDROCHLORIDE PRN MG: 25 SUPPOSITORY RECTAL at 12:00

## 2018-05-01 RX ADMIN — OXYCODONE AND ACETAMINOPHEN PRN TAB: 5; 325 TABLET ORAL at 22:47

## 2018-05-01 RX ADMIN — ONDANSETRON SCH MG: 2 INJECTION INTRAMUSCULAR; INTRAVENOUS at 22:47

## 2018-05-01 RX ADMIN — POTASSIUM CHLORIDE SCH MLS/HR: 10 INJECTION, SOLUTION INTRAVENOUS at 09:15

## 2018-05-01 RX ADMIN — ONDANSETRON SCH MG: 2 INJECTION INTRAMUSCULAR; INTRAVENOUS at 04:43

## 2018-05-01 NOTE — GASTROENTEROLOGY PROGRESS NOTE
<LINDSEY ASCENCIO - Last Filed: 05/01/18 11:15>





Assessment and Plan


1.intractable N/V


-abd U/S negative


-LFTs-WNL


-etiology-most likely due to hyperemesis gravidarum


-continue carafate


-start on PPI


-clear liquids, advance as tolerated


-continue antiemetics and supportive care


-electrolyte management per primary team











Subjective


Date of service: 05/01/18


Principal diagnosis: persistent N/V


Interval history: 


No acute distress. Patient reports continued N/V this am with frequency 

increased to every hour.








Objective





- Constitutional


Vitals: 


 











Temp Pulse Resp BP Pulse Ox


 


 98.4 F   84   18   119/86   95 


 


 05/01/18 08:10  05/01/18 08:10  05/01/18 08:10  05/01/18 08:10  05/01/18 08:10











General appearance: no acute distress, other (ill appearing)





- Respiratory


Respiratory: bilateral: CTA





- Cardiovascular


Rhythm: regular


Heart Sounds: Present: S1 & S2





- Gastrointestinal


General gastrointestinal: Present: soft, non-tender, non-distended, normal 

bowel sounds





- Neurologic


Neurological: alert and oriented x3





- Labs


CBC & Chem 7: 


 04/26/18 07:06





 05/01/18 06:10


Labs: 


 Laboratory Results - last 24 hr











  04/30/18 05/01/18





  17:49 06:10


 


Sodium   138


 


Potassium   3.0 L


 


Chloride   102.0


 


Carbon Dioxide   22


 


Anion Gap   17


 


BUN   1 L


 


Creatinine   0.4 L


 


Estimated GFR   > 60


 


BUN/Creatinine Ratio   3


 


Glucose   106 H


 


Calcium   8.6


 


Total Bilirubin  0.30  0.40


 


Direct Bilirubin  0.2 


 


Indirect Bilirubin  0.1 


 


AST  11  10


 


ALT  8  8


 


Alkaline Phosphatase  54  55


 


Total Protein  5.5 L  5.5 L


 


Albumin  3.1 L  3.2 L


 


Albumin/Globulin Ratio  1.3  1.4


 


Lipase  122 H 














<PENNY REYEZ - Last Filed: 05/01/18 20:57>





Assessment and Plan


pt seen and examined.  agree with note by Lindsey Ascencio. 








Objective





- Constitutional


Vitals: 


 











Temp Pulse Resp BP Pulse Ox


 


 98.5 F   83   18   107/63   97 


 


 05/01/18 16:05  05/01/18 16:05  05/01/18 16:05  05/01/18 16:05  05/01/18 16:05














- Labs


CBC & Chem 7: 


 04/26/18 07:06





 05/01/18 15:13


Labs: 


 Laboratory Results - last 24 hr











  05/01/18 05/01/18





  06:10 15:13


 


Sodium  138 


 


Potassium  3.0 L  4.6  D


 


Chloride  102.0 


 


Carbon Dioxide  22 


 


Anion Gap  17 


 


BUN  1 L 


 


Creatinine  0.4 L 


 


Estimated GFR  > 60 


 


BUN/Creatinine Ratio  3 


 


Glucose  106 H 


 


Calcium  8.6 


 


Total Bilirubin  0.40 


 


AST  10 


 


ALT  8 


 


Alkaline Phosphatase  55 


 


Total Protein  5.5 L 


 


Albumin  3.2 L 


 


Albumin/Globulin Ratio  1.4

## 2018-05-01 NOTE — PROGRESS NOTE
Assessment and Plan





- Patient Problems


(1) 12 weeks gestation of pregnancy


Onset Date: 04/25/18   Current Visit: Yes   Status: Acute   


Plan to address problem: 


A:  IUP @ 12+ weeks


      Hyperemesis gravidarum - not improving


      Hypokalemia - 


      Hyponatremia - resolved


      Dehydration - improving





 P:  Continue with present management


      Approved Home Health care Zofran pump


      Appreciate GI consultation


      Will begin IV Steroid therapy








(2) Hyponatremia


Onset Date: 04/25/18   Current Visit: No   Status: Acute   





(3) Dehydration


Onset Date: 04/25/18   Current Visit: No   Status: Resolved   





(4) Hyperemesis gravidarum


Onset Date: 04/25/18   Current Visit: No   Status: Resolved   





(5) Hypokalemia


Onset Date: 04/25/18   Current Visit: No   Status: Resolved   





Subjective





- Subjective


Date of service: 05/01/18


Principal diagnosis: Hyperemesis gravidarum;  Hypokalemia; Hyponatremia


Interval history: 





Pt states her vomiting is getting worse despite IV Zofran. 


Patient reports: new complaints (Heartburn), no loss of fluid, no vaginal 

bleeding, no contractions





Objective





- Vital Signs


Vital Signs: 


 Vital Signs - 12hr











  05/01/18 05/01/18 05/01/18





  00:45 05:53 08:10


 


Temperature 98.8 F 98.7 F 98.4 F


 


Pulse Rate 77 78 84


 


Respiratory 18 20 18





Rate   


 


Blood Pressure 121/64  119/86


 


Blood Pressure  107/75 





[Right]   


 


O2 Sat by Pulse 99  95





Oximetry   














- Exam


Cardiovascular: Regular rate


Abdomen: Present: normal appearance, soft





- Labs


Labs: 


 Abnormal Labs











  04/25/18 04/25/18 04/26/18





  19:57 19:57 07:06


 


MCHC    35 H


 


Sodium   135 L 


 


Potassium   3.0 L D 


 


Chloride   93.5 L 


 


BUN   


 


Creatinine   0.5 L 


 


Glucose   115 H 


 


Total Protein   


 


Albumin   


 


Lipase  63 H  


 


U Epithel Cells (Auto)   














  04/26/18 04/27/18 04/28/18





  13:38 19:12 08:07


 


MCHC   


 


Sodium   134 L 


 


Potassium   3.0 L  3.5 L


 


Chloride   


 


BUN   < 1 L  < 1 L


 


Creatinine   0.4 L  0.4 L


 


Glucose   


 


Total Protein   


 


Albumin   


 


Lipase   


 


U Epithel Cells (Auto)  17.0 H  














  04/30/18 04/30/18 05/01/18





  08:19 17:49 06:10


 


MCHC   


 


Sodium  135 L  


 


Potassium  3.2 L   3.0 L


 


Chloride   


 


BUN  < 1 L   1 L


 


Creatinine  0.4 L   0.4 L


 


Glucose  107 H   106 H


 


Total Protein   5.5 L  5.5 L


 


Albumin   3.1 L  3.2 L


 


Lipase   122 H 


 


U Epithel Cells (Auto)   








 Laboratory Results - last 24 hr











  04/30/18 05/01/18





  17:49 06:10


 


Sodium   138


 


Potassium   3.0 L


 


Chloride   102.0


 


Carbon Dioxide   22


 


Anion Gap   17


 


BUN   1 L


 


Creatinine   0.4 L


 


Estimated GFR   > 60


 


BUN/Creatinine Ratio   3


 


Glucose   106 H


 


Calcium   8.6


 


Total Bilirubin  0.30  0.40


 


Direct Bilirubin  0.2 


 


Indirect Bilirubin  0.1 


 


AST  11  10


 


ALT  8  8


 


Alkaline Phosphatase  54  55


 


Total Protein  5.5 L  5.5 L


 


Albumin  3.1 L  3.2 L


 


Albumin/Globulin Ratio  1.3  1.4


 


Lipase  122 H

## 2018-05-02 LAB
BUN SERPL-MCNC: 2 MG/DL (ref 7–17)
BUN/CREAT SERPL: 5 %
CALCIUM SERPL-MCNC: 8.8 MG/DL (ref 8.4–10.2)
HCT VFR BLD CALC: 34.7 % (ref 30.3–42.9)
HEMOLYSIS INDEX: 4
HGB BLD-MCNC: 11.4 GM/DL (ref 10.1–14.3)
MCH RBC QN AUTO: 26 PG (ref 28–32)
MCHC RBC AUTO-ENTMCNC: 33 % (ref 30–34)
MCV RBC AUTO: 80 FL (ref 79–97)
PLATELET # BLD: 274 K/MM3 (ref 140–440)
RBC # BLD AUTO: 4.31 M/MM3 (ref 3.65–5.03)

## 2018-05-02 RX ADMIN — ONDANSETRON SCH MG: 2 INJECTION INTRAMUSCULAR; INTRAVENOUS at 14:16

## 2018-05-02 RX ADMIN — Medication SCH: at 10:28

## 2018-05-02 RX ADMIN — PANTOPRAZOLE SODIUM SCH MG: 40 INJECTION, POWDER, FOR SOLUTION INTRAVENOUS at 10:10

## 2018-05-02 RX ADMIN — DEXTROSE, SODIUM CHLORIDE, SODIUM LACTATE, POTASSIUM CHLORIDE, AND CALCIUM CHLORIDE SCH MLS/HR: 5; .6; .31; .03; .02 INJECTION, SOLUTION INTRAVENOUS at 04:28

## 2018-05-02 RX ADMIN — SODIUM CHLORIDE, SODIUM LACTATE, POTASSIUM CHLORIDE, AND CALCIUM CHLORIDE SCH MLS/HR: .6; .31; .03; .02 INJECTION, SOLUTION INTRAVENOUS at 21:10

## 2018-05-02 RX ADMIN — SUCRALFATE SCH GM: 1 TABLET ORAL at 12:45

## 2018-05-02 RX ADMIN — SUCRALFATE SCH GM: 1 TABLET ORAL at 02:35

## 2018-05-02 RX ADMIN — DEXTROSE, SODIUM CHLORIDE, SODIUM LACTATE, POTASSIUM CHLORIDE, AND CALCIUM CHLORIDE SCH MLS/HR: 5; .6; .31; .03; .02 INJECTION, SOLUTION INTRAVENOUS at 13:40

## 2018-05-02 NOTE — GASTROENTEROLOGY PROGRESS NOTE
<MAKAYLA ASCENCIO - Last Filed: 05/02/18 11:26>





Assessment and Plan


1.intractable N/V


-abd U/S negative


-LFTs-WNL


-etiology-most likely due to hyperemesis gravidarum


-pt with plans to be d/c home on zofran pump


-continue current medications and supportive care


-advance diet as tolerated


-electrolyte management per primary team


-no further GI recommendations 


-will sign off, please call if needed








Subjective


Date of service: 05/02/18


Principal diagnosis: persistent N/V


Interval history: 


Patient resting in bed this am w/o acute distress and family at bedside. 

Reports continued N/V with 3 episodes this am so far.








Objective





- Constitutional


Vitals: 


 











Temp Pulse Resp BP Pulse Ox


 


 97.9 F   83   16   131/81   100 


 


 05/02/18 08:07  05/02/18 08:07  05/02/18 08:07  05/02/18 08:07  05/02/18 08:07











General appearance: no acute distress





- Respiratory


Respiratory: bilateral: CTA





- Cardiovascular


Rhythm: regular


Heart Sounds: Present: S1 & S2





- Gastrointestinal


General gastrointestinal: Present: soft, non-tender, non-distended, normal 

bowel sounds





- Neurologic


Neurological: alert and oriented x3





- Labs


CBC & Chem 7: 


 04/26/18 07:06





 05/02/18 06:11


Labs: 


 Laboratory Results - last 24 hr











  05/01/18 05/02/18





  15:13 06:11


 


Sodium   136 L


 


Potassium  4.6  D  3.5 L D


 


Chloride   100.7


 


Carbon Dioxide   21 L


 


Anion Gap   18


 


BUN   2 L


 


Creatinine   0.4 L


 


Estimated GFR   > 60


 


BUN/Creatinine Ratio   5


 


Glucose   134 H


 


Calcium   8.8














<PENNY REYEZ - Last Filed: 05/02/18 12:58>





Assessment and Plan


Patient seen and examined.  Agree with note above.  Still with n/v but overall 

seems to be slowly improving.  Steroids were started yesterday by primary team.

  PPI initiated.  Will sign off, please call back as needed or with questions.








Objective





- Constitutional


Vitals: 


 











Temp Pulse Resp BP Pulse Ox


 


 99.1 F   96 H  20   121/65   99 


 


 05/02/18 11:19  05/02/18 11:19  05/02/18 11:19  05/02/18 11:19  05/02/18 11:19














- Labs


CBC & Chem 7: 


 04/26/18 07:06





 05/02/18 06:11


Labs: 


 Laboratory Results - last 24 hr











  05/01/18 05/02/18





  15:13 06:11


 


Sodium   136 L


 


Potassium  4.6  D  3.5 L D


 


Chloride   100.7


 


Carbon Dioxide   21 L


 


Anion Gap   18


 


BUN   2 L


 


Creatinine   0.4 L


 


Estimated GFR   > 60


 


BUN/Creatinine Ratio   5


 


Glucose   134 H


 


Calcium   8.8

## 2018-05-02 NOTE — PROGRESS NOTE
Assessment and Plan





- Patient Problems


(1) 12 weeks gestation of pregnancy


Onset Date: 04/25/18   Current Visit: Yes   Status: Acute   


Plan to address problem: 


A:  IUP @ 12+ weeks


      Hyperemesis gravidarum - improving on IV steroids


      Hypokalemia - improving


      Hyponatremia - resolved


      Dehydration - improving





 P:  Continue with present management


      Approved [facility] Zofran pump


      Appreciate GI consultation


      Continue IV Steroid therapy








(2) Hyponatremia


Onset Date: 04/25/18   Current Visit: No   Status: Acute   





(3) Dehydration


Onset Date: 04/25/18   Current Visit: No   Status: Resolved   





(4) Hyperemesis gravidarum


Onset Date: 04/25/18   Current Visit: No   Status: Resolved   





(5) Hypokalemia


Onset Date: 04/25/18   Current Visit: No   Status: Resolved   





Subjective





- Subjective


Date of service: 05/02/18


Principal diagnosis: Hyperemesis gravidarum;  Hypokalemia; Hyponatremia


Interval history: 





Pt states she is feeling better after receiving steroids.


Patient reports: new complaints (Heartburn), no loss of fluid, no vaginal 

bleeding, no contractions





Objective





- Vital Signs


Vital Signs: 


 Vital Signs - 12hr











  05/01/18 05/01/18 05/02/18





  22:11 22:47 01:54


 


Temperature 98.4 F  98.5 F


 


Pulse Rate 72  81


 


Respiratory 20 16 20





Rate   


 


Blood Pressure 109/62  103/62


 


O2 Sat by Pulse 99  97





Oximetry   














  05/02/18





  05:10


 


Temperature 98.4 F


 


Pulse Rate 84


 


Respiratory 18





Rate 


 


Blood Pressure 111/59


 


O2 Sat by Pulse 99





Oximetry 














- Exam


Cardiovascular: Regular rate


Lungs: Clear to auscultation


Abdomen: Present: normal appearance, soft





- Labs


Labs: 


 Abnormal Labs











  04/25/18 04/25/18 04/26/18





  19:57 19:57 07:06


 


MCHC    35 H


 


Sodium   135 L 


 


Potassium   3.0 L D 


 


Chloride   93.5 L 


 


Carbon Dioxide   


 


BUN   


 


Creatinine   0.5 L 


 


Glucose   115 H 


 


Total Protein   


 


Albumin   


 


Lipase  63 H  


 


U Epithel Cells (Auto)   














  04/26/18 04/27/18 04/28/18





  13:38 19:12 08:07


 


MCHC   


 


Sodium   134 L 


 


Potassium   3.0 L  3.5 L


 


Chloride   


 


Carbon Dioxide   


 


BUN   < 1 L  < 1 L


 


Creatinine   0.4 L  0.4 L


 


Glucose   


 


Total Protein   


 


Albumin   


 


Lipase   


 


U Epithel Cells (Auto)  17.0 H  














  04/30/18 04/30/18 05/01/18





  08:19 17:49 06:10


 


MCHC   


 


Sodium  135 L  


 


Potassium  3.2 L   3.0 L


 


Chloride   


 


Carbon Dioxide   


 


BUN  < 1 L   1 L


 


Creatinine  0.4 L   0.4 L


 


Glucose  107 H   106 H


 


Total Protein   5.5 L  5.5 L


 


Albumin   3.1 L  3.2 L


 


Lipase   122 H 


 


U Epithel Cells (Auto)   














  05/02/18





  06:11


 


MCHC 


 


Sodium  136 L


 


Potassium  3.5 L D


 


Chloride 


 


Carbon Dioxide  21 L


 


BUN  2 L


 


Creatinine  0.4 L


 


Glucose  134 H


 


Total Protein 


 


Albumin 


 


Lipase 


 


U Epithel Cells (Auto) 








 Laboratory Results - last 24 hr











  05/01/18 05/02/18





  15:13 06:11


 


Sodium   136 L


 


Potassium  4.6  D  3.5 L D


 


Chloride   100.7


 


Carbon Dioxide   21 L


 


Anion Gap   18


 


BUN   2 L


 


Creatinine   0.4 L


 


Estimated GFR   > 60


 


BUN/Creatinine Ratio   5


 


Glucose   134 H


 


Calcium   8.8

## 2018-05-02 NOTE — ULTRASOUND REPORT
ULTRASOUND OB LESS THAN 14 WEEKS FETUS



HISTORY: Fetal well-being.



TECHNIQUE: Transabdominal ultrasound imaging with color and M mode 

Doppler interrogation.



FINDINGS: Compared to the exam dated 4/21/18. An intrauterine pregnancy 

is again identified estimated at 13 weeks 6 days. No fetal heart tones 

could be demonstrated on Doppler interrogation.



The placenta is forming posteriorly. Amniotic fluid appears normal. The 

cervix is obscured.



The ovaries are normal size, contour and echotexture. No pelvic fluid 

collection.



IMPRESSION:

Fetal demise.

## 2018-05-03 LAB
HCT VFR BLD CALC: 34.2 % (ref 30.3–42.9)
HGB BLD-MCNC: 11 GM/DL (ref 10.1–14.3)

## 2018-05-03 PROCEDURE — 10D17ZZ EXTRACTION OF PRODUCTS OF CONCEPTION, RETAINED, VIA NATURAL OR ARTIFICIAL OPENING: ICD-10-PCS | Performed by: OBSTETRICS & GYNECOLOGY

## 2018-05-03 RX ADMIN — HYDROMORPHONE HYDROCHLORIDE PRN MG: 1 INJECTION, SOLUTION INTRAMUSCULAR; INTRAVENOUS; SUBCUTANEOUS at 12:14

## 2018-05-03 RX ADMIN — ONDANSETRON SCH MG: 2 INJECTION INTRAMUSCULAR; INTRAVENOUS at 00:45

## 2018-05-03 RX ADMIN — SUCRALFATE SCH: 1 TABLET ORAL at 00:45

## 2018-05-03 RX ADMIN — SODIUM CHLORIDE, SODIUM LACTATE, POTASSIUM CHLORIDE, AND CALCIUM CHLORIDE SCH MLS/HR: .6; .31; .03; .02 INJECTION, SOLUTION INTRAVENOUS at 10:10

## 2018-05-03 RX ADMIN — HYDROMORPHONE HYDROCHLORIDE PRN MG: 1 INJECTION, SOLUTION INTRAMUSCULAR; INTRAVENOUS; SUBCUTANEOUS at 12:05

## 2018-05-03 RX ADMIN — HYDROMORPHONE HYDROCHLORIDE PRN MG: 1 INJECTION, SOLUTION INTRAMUSCULAR; INTRAVENOUS; SUBCUTANEOUS at 14:14

## 2018-05-03 RX ADMIN — HYDROMORPHONE HYDROCHLORIDE PRN MG: 1 INJECTION, SOLUTION INTRAMUSCULAR; INTRAVENOUS; SUBCUTANEOUS at 13:30

## 2018-05-03 RX ADMIN — METHYLERGONOVINE MALEATE SCH MG: 0.2 TABLET ORAL at 21:47

## 2018-05-03 RX ADMIN — METHYLERGONOVINE MALEATE SCH MG: 0.2 TABLET ORAL at 14:35

## 2018-05-03 NOTE — ANESTHESIA CONSULTATION
Anesthesia Consult and Med Hx


Date of service: 05/03/18





- Airway


Anesthetic Teeth Evaluation: Chipped


ROM Head & Neck: Adequate


Mental/Hyoid Distance: Adequate


Mallampati Class: Class II


Intubation Access Assessment: Probably Good





- Pulmonary Exam


CTA: Yes





- Cardiac Exam


Cardiac Exam: RRR





- Pre-Operative Health Status


ASA Pre-Surgery Classification: ASA2


Proposed Anesthetic Plan: General





- Cardiovascular System


Hx Hypertension: No

## 2018-05-03 NOTE — OPERATIVE REPORT
Operative Report


Operative Report: 





PREOPERATIVE DIAGNOSIS: 1.  Severe hyperemesis gravidarum   2.  Intrauterine 

fetal demise





POSTOPERATIVE DIAGNOSIS: Same





OPERATIVE PROCEDURE:  Dilatation and curettage.





SURGEON:  Luis Haynes MD





ANESTHESIA: Gen. mask





ANESTHESIOLOGIST: Dr. Mccall





ESTIMATED BLOOD LOSS: 60 mL





FINDINGS: A 12-14 week size uterus with large amounts of products of conception





COMPLICATIONS: None





COUNTS:  Correct x3.





PROCEDURE:  After the patient was correctly identified, and after general 

anesthesia was administered, the patient was prepped and draped in the usual 

sterile fashion and placed in dorsal lithotomy position.  First, the bladder 

was emptied using a straight catheter.  Next, a speculum was placed in the 

vaginal vault and the anterior lip of the cervix was grasped using a single-

tooth tenaculum.  The uterus was sounded to 12 cm.  The cervical os was 

sequentially dilated, and a 12 mm vaccurette was used to suction blood and 

products of conception from the uterine cavity.  After all the products of 

conception were removed, the procedure was considered complete.  All 

instruments were removed from the vagina.  The patient tolerated the procedure 

well and was transferred to the recovery room in stable condition.

## 2018-05-04 VITALS — SYSTOLIC BLOOD PRESSURE: 107 MMHG | DIASTOLIC BLOOD PRESSURE: 66 MMHG

## 2018-05-04 PROCEDURE — 3E0234Z INTRODUCTION OF SERUM, TOXOID AND VACCINE INTO MUSCLE, PERCUTANEOUS APPROACH: ICD-10-PCS | Performed by: OBSTETRICS & GYNECOLOGY

## 2018-05-04 RX ADMIN — OXYCODONE AND ACETAMINOPHEN PRN TAB: 5; 325 TABLET ORAL at 06:47

## 2018-05-04 RX ADMIN — METHYLERGONOVINE MALEATE SCH MG: 0.2 TABLET ORAL at 06:46

## 2018-05-04 NOTE — PROGRESS NOTE
Assessment and Plan





- Patient Problems


(1) 12 weeks gestation of pregnancy


Onset Date: 04/25/18   Current Visit: Yes   Status: Resolved   





(2) Hyponatremia


Onset Date: 04/25/18   Current Visit: No   Status: Resolved   





(3) S/P D&C (status post dilation and curettage)


Onset Date: 05/04/18   Current Visit: Yes   Status: Resolved   


Plan to address problem: 


A:  S/P D&C for IUFD - POD #1  Doing well


      Hyperemesis - resolved





 P:  May go home today








Subjective





- Subjective


Date of service: 05/04/18


Principal diagnosis: s/p D&C


Interval history: 





Pt states she is feeling well without complaints.  Bleeding improved.  

Tolerating a reg diet without nausea or vomiting.


Patient reports: appetite normal, voiding normally, pain well controlled, flatus

, ambulating normally, no dizzy ambulation, no nauseated





Objective





- Vital Signs


Latest vital signs: 


 Vital Signs











  Temp Pulse Resp BP BP Pulse Ox


 


 05/04/18 06:47    18   


 


 05/04/18 04:15  98.2 F  80  20   101/66 


 


 05/04/18 00:00  98.6 F  63  20   128/76 


 


 05/03/18 21:29  98.2 F  73  20   131/72 


 


 05/03/18 16:44  97.7 F  66  16  120/80   98


 


 05/03/18 14:44    14   


 


 05/03/18 14:00    14   


 


 05/03/18 13:30    16   


 


 05/03/18 12:30  97.7 F  68  12  130/87   99


 


 05/03/18 12:15   67  12  134/86   98


 


 05/03/18 12:14    14   


 


 05/03/18 12:05    14   


 


 05/03/18 12:00   71  14  139/88   99


 


 05/03/18 11:30   78  12  116/74   100


 


 05/03/18 11:25   79  12  120/78   100


 


 05/03/18 11:20  97.4 F L  87  12  115/81   100


 


 05/03/18 09:50  98.0 F  71  18  136/78   100


 


 05/03/18 09:40  98.0 F  71  18  136/78   100








 Intake and Output











 05/03/18 05/04/18 05/04/18





 22:59 06:59 14:59


 


Intake Total 120 120 


 


Output Total 200  


 


Balance -80 120 


 


Intake:   


 


  Oral 120 120 


 


Output:   


 


  Urine 200  


 


    Void 200  


 


Other:   


 


  Total, Intake Amount 120 120 


 


  Total, Output Amount 200  


 


  # Voids   


 


    Void  1 














- Exam


Cardiovascular: Present: Regular rate


Lungs: Present: Clear to auscultation


Abdomen: Present: normal appearance, soft


Uterus: Present: normal


Extremities: Present: normal





- Labs


Labs: 





 Laboratory Last Values











WBC  10.9 K/mm3 (4.5-11.0)   05/02/18  18:52    


 


RBC  4.31 M/mm3 (3.65-5.03)   05/02/18  18:52    


 


Hgb  11.0 gm/dl (10.1-14.3)   05/03/18  22:54    


 


Hct  34.2 % (30.3-42.9)   05/03/18  22:54    


 


MCV  80 fl (79-97)   05/02/18  18:52    


 


MCH  26 pg (28-32)  L  05/02/18  18:52    


 


MCHC  33 % (30-34)   05/02/18  18:52    


 


RDW  14.1 % (13.2-15.2)   05/02/18  18:52    


 


Plt Count  274 K/mm3 (140-440)   05/02/18  18:52    


 


Sodium  136 mmol/L (137-145)  L  05/02/18  06:11    


 


Potassium  3.5 mmol/L (3.6-5.0)  L D 05/02/18  06:11    


 


Chloride  100.7 mmol/L ()   05/02/18  06:11    


 


Carbon Dioxide  21 mmol/L (22-30)  L  05/02/18  06:11    


 


Anion Gap  18 mmol/L  05/02/18  06:11    


 


BUN  2 mg/dL (7-17)  L  05/02/18  06:11    


 


Creatinine  0.4 mg/dL (0.7-1.2)  L  05/02/18  06:11    


 


Estimated GFR  > 60 ml/min  05/02/18  06:11    


 


BUN/Creatinine Ratio  5 %  05/02/18  06:11    


 


Glucose  134 mg/dL ()  H  05/02/18  06:11    


 


Calcium  8.8 mg/dL (8.4-10.2)   05/02/18  06:11    


 


Total Bilirubin  0.40 mg/dL (0.1-1.2)   05/01/18  06:10    


 


Direct Bilirubin  0.2 mg/dL (0-0.2)   04/30/18  17:49    


 


Indirect Bilirubin  0.1 mg/dL  04/30/18  17:49    


 


AST  10 units/L (5-40)   05/01/18  06:10    


 


ALT  8 units/L (7-56)   05/01/18  06:10    


 


Alkaline Phosphatase  55 units/L ()   05/01/18  06:10    


 


Total Protein  5.5 g/dL (6.3-8.2)  L  05/01/18  06:10    


 


Albumin  3.2 g/dL (3.9-5)  L  05/01/18  06:10    


 


Albumin/Globulin Ratio  1.4 %  05/01/18  06:10    


 


Amylase  111 units/L ()   04/25/18  19:57    


 


Lipase  122 units/L (13-60)  H  04/30/18  17:49    


 


Urine Color  Rosi  (Yellow)   04/26/18  13:38    


 


Urine Turbidity  Clear  (Clear)   04/26/18  13:38    


 


Urine pH  7.0  (5.0-7.0)   04/26/18  13:38    


 


Ur Specific Gravity  1.029  (1.003-1.030)   04/26/18  13:38    


 


Urine Protein  100 mg/dl mg/dL (Negative)   04/26/18  13:38    


 


Urine Glucose (UA)  150 mg/dL (Negative)   04/26/18  13:38    


 


Urine Ketones  20 mg/dL (Negative)   04/26/18  13:38    


 


Urine Blood  Neg  (Negative)   04/26/18  13:38    


 


Urine Nitrite  Neg  (Negative)   04/26/18  13:38    


 


Urine Bilirubin  Neg  (Negative)   04/26/18  13:38    


 


Urine Urobilinogen  2.0 mg/dL (<2.0)   04/26/18  13:38    


 


Ur Leukocyte Esterase  Mod  (Negative)   04/26/18  13:38    


 


Urine WBC (Auto)  5.0 /HPF (0.0-6.0)   04/26/18  13:38    


 


Urine RBC (Auto)  4.0 /HPF (0.0-6.0)   04/26/18  13:38    


 


U Epithel Cells (Auto)  17.0 /HPF (0-13.0)  H  04/26/18  13:38    


 


Urine Mucus  3+ /HPF  04/26/18  13:38    


 


Blood Type  O POSITIVE   05/02/18  18:52

## 2018-06-08 ENCOUNTER — HOSPITAL ENCOUNTER (EMERGENCY)
Dept: HOSPITAL 5 - ED | Age: 33
Discharge: HOME | End: 2018-06-08
Payer: MEDICAID

## 2018-06-08 VITALS — DIASTOLIC BLOOD PRESSURE: 70 MMHG | SYSTOLIC BLOOD PRESSURE: 130 MMHG

## 2018-06-08 DIAGNOSIS — M79.89: Primary | ICD-10-CM

## 2018-06-08 DIAGNOSIS — R94.5: ICD-10-CM

## 2018-06-08 DIAGNOSIS — R50.9: ICD-10-CM

## 2018-06-08 LAB
ALBUMIN SERPL-MCNC: 3.7 G/DL (ref 3.9–5)
ALT SERPL-CCNC: 68 UNITS/L (ref 7–56)
BASOPHILS # (AUTO): 0.1 K/MM3 (ref 0–0.1)
BASOPHILS NFR BLD AUTO: 0.7 % (ref 0–1.8)
BILIRUB UR QL STRIP: (no result)
BLOOD UR QL VISUAL: (no result)
BUN SERPL-MCNC: 13 MG/DL (ref 7–17)
BUN/CREAT SERPL: 19 %
CALCIUM SERPL-MCNC: 8.8 MG/DL (ref 8.4–10.2)
EOSINOPHIL # BLD AUTO: 0.1 K/MM3 (ref 0–0.4)
EOSINOPHIL NFR BLD AUTO: 1.4 % (ref 0–4.3)
HCT VFR BLD CALC: 35.3 % (ref 30.3–42.9)
HEMOLYSIS INDEX: 41
HGB BLD-MCNC: 11.7 GM/DL (ref 10.1–14.3)
LYMPHOCYTES # BLD AUTO: 2.5 K/MM3 (ref 1.2–5.4)
LYMPHOCYTES NFR BLD AUTO: 26.9 % (ref 13.4–35)
MCH RBC QN AUTO: 28 PG (ref 28–32)
MCHC RBC AUTO-ENTMCNC: 33 % (ref 30–34)
MCV RBC AUTO: 83 FL (ref 79–97)
MONOCYTES # (AUTO): 0.7 K/MM3 (ref 0–0.8)
MONOCYTES % (AUTO): 7.3 % (ref 0–7.3)
MUCOUS THREADS #/AREA URNS HPF: (no result) /HPF
PH UR STRIP: 5 [PH] (ref 5–7)
PLATELET # BLD: 290 K/MM3 (ref 140–440)
PROT UR STRIP-MCNC: (no result) MG/DL
RBC # BLD AUTO: 4.25 M/MM3 (ref 3.65–5.03)
RBC #/AREA URNS HPF: 1 /HPF (ref 0–6)
UROBILINOGEN UR-MCNC: < 2 MG/DL (ref ?–2)
WBC #/AREA URNS HPF: < 1 /HPF (ref 0–6)

## 2018-06-08 PROCEDURE — 85025 COMPLETE CBC W/AUTO DIFF WBC: CPT

## 2018-06-08 PROCEDURE — 82550 ASSAY OF CK (CPK): CPT

## 2018-06-08 PROCEDURE — 85379 FIBRIN DEGRADATION QUANT: CPT

## 2018-06-08 PROCEDURE — 93970 EXTREMITY STUDY: CPT

## 2018-06-08 PROCEDURE — 99284 EMERGENCY DEPT VISIT MOD MDM: CPT

## 2018-06-08 PROCEDURE — 83880 ASSAY OF NATRIURETIC PEPTIDE: CPT

## 2018-06-08 PROCEDURE — 96372 THER/PROPH/DIAG INJ SC/IM: CPT

## 2018-06-08 PROCEDURE — 84703 CHORIONIC GONADOTROPIN ASSAY: CPT

## 2018-06-08 PROCEDURE — 80053 COMPREHEN METABOLIC PANEL: CPT

## 2018-06-08 PROCEDURE — 71275 CT ANGIOGRAPHY CHEST: CPT

## 2018-06-08 PROCEDURE — 81001 URINALYSIS AUTO W/SCOPE: CPT

## 2018-06-08 PROCEDURE — 36415 COLL VENOUS BLD VENIPUNCTURE: CPT

## 2018-06-08 NOTE — EMERGENCY DEPARTMENT REPORT
<KATIE MIRELES - Last Filed: 18 06:04>





ED Extremity Problem HPI





- General


Chief complaint: Extremity Problem,Nontraumatic


Stated complaint: FEET SWELLING


Time Seen by Provider: 18 04:22


Source: patient


Mode of arrival: Ambulatory


Limitations: No Limitations





- History of Present Illness


Initial comments: 


32-year-old female past medical history  A for multiple abortions, 

dilation and curettage 18 with Dr. Luis Haynes presents with complaint of 

2-3 days of persistent bilateral lower extremity swelling from mid shin regions 

downward now progressing up to pretibial regions bilaterally.  Patient denies 

any fevers chills nausea vomiting dysuria flank pain and body aches chest pain 

shortness of breath nausea or vomiting.  Patient denies any recent long 

distance travel personal history of PE or DVT.  Patient does state that she had 

D&C procedure done on May 4.  Denies any other recent surgeries.  Denies being 

on any form of birth control.  Denies any other recent illnesses or viral 

illnesses.  Denies any headache or blurry vision.


MD Complaint: extremity swelling (b/l LE 2 days)


Onset/Timin


-: days(s)


Location: left, right, bilateral lower extremity


History of Same: No


Quality: aching


Consistency: constant


Improves with: nothing


Worsens with: nothing





- Related Data


 Previous Rx's











 Medication  Instructions  Recorded  Last Taken  Type


 


Ondansetron [Zofran] 4 mg PO Q6HR PRN #20 tablet 14 1 Week Ago Rx





   ~18 


 


Methylergonovine [Methergine] 0.2 mg PO Q8HR #6 tablet 18 Unknown Rx


 


oxyCODONE /ACETAMINOPHEN [Percocet 1 tab PO Q6HR PRN #20 tablet 18 

Unknown Rx





5/325 mg]    


 


Acetaminophen [Tylenol] 500 mg PO Q6HR PRN #12 tablet 18 Unknown Rx


 


Furosemide [Lasix] 20 mg PO BID 3 Days #6 tablet 18 Unknown Rx


 


Potassium Chloride [K-Dur] 20 meq PO BID 3 Days #6 tab 18 Unknown Rx











 Allergies











Allergy/AdvReac Type Severity Reaction Status Date / Time


 


ibuprofen [From Motrin] AdvReac  Headache Verified 11/23/15 11:25


 


metoclopramide [From Reglan] AdvReac  Dizziness Verified 18 10:37














ED Review of Systems


ROS: 


Stated complaint: FEET SWELLING


Other details as noted in HPI





Constitutional: denies: chills, fever


Eyes: denies: eye pain, eye discharge, vision change


ENT: denies: ear pain, throat pain


Respiratory: denies: cough, shortness of breath, wheezing


Cardiovascular: denies: chest pain, palpitations


Endocrine: no symptoms reported, other


Gastrointestinal: denies: abdominal pain, nausea, diarrhea


Genitourinary: denies: urgency, dysuria, discharge


Musculoskeletal: denies: back pain, joint swelling, arthralgia


Skin: denies: rash, lesions


Neurological: denies: headache, weakness, paresthesias


Psychiatric: denies: anxiety, depression


Hematological/Lymphatic: denies: easy bleeding, easy bruising





ED Past Medical Hx





- Past Medical History


Previous Medical History?: Yes


Hx Hypertension: No


Additional medical history:  A0





- Surgical History


Past Surgical History?: Yes


Additional Surgical History: abortions.  cyst on leg removed





- Social History


Smoking Status: Former Smoker


Substance Use Type: None





- Medications


Home Medications: 


 Home Medications











 Medication  Instructions  Recorded  Confirmed  Last Taken  Type


 


Ondansetron [Zofran] 4 mg PO Q6HR PRN #20 tablet 14 1 Week Ago Rx





    ~18 


 


Methylergonovine [Methergine] 0.2 mg PO Q8HR #6 tablet 18  Unknown Rx


 


oxyCODONE /ACETAMINOPHEN [Percocet 1 tab PO Q6HR PRN #20 tablet 18  

Unknown Rx





5/325 mg]     


 


Acetaminophen [Tylenol] 500 mg PO Q6HR PRN #12 tablet 18  Unknown Rx


 


Furosemide [Lasix] 20 mg PO BID 3 Days #6 tablet 18  Unknown Rx


 


Potassium Chloride [K-Dur] 20 meq PO BID 3 Days #6 tab 18  Unknown Rx














ED Physical Exam





- General


Limitations: No Limitations (bilateral lower extremity swelling)


General appearance: alert, in no apparent distress





- Head


Head exam: Present: atraumatic, normocephalic





- Eye


Eye exam: Present: normal appearance





- ENT


ENT exam: Present: mucous membranes moist





- Neck


Neck exam: Present: normal inspection





- Respiratory


Respiratory exam: Present: normal lung sounds bilaterally.  Absent: respiratory 

distress





- Cardiovascular


Cardiovascular Exam: Present: regular rate, normal rhythm.  Absent: systolic 

murmur, diastolic murmur, rubs, gallop





- GI/Abdominal


GI/Abdominal exam: Present: soft, normal bowel sounds





- Extremities Exam


Extremities exam: Present: normal inspection, pedal edema (bilateral pedal 

edema approximately +1 extending up to both pretibial regions bilaterally)





- Back Exam


Back exam: Present: normal inspection





- Neurological Exam


Neurological exam: Present: alert, oriented X3





- Psychiatric


Psychiatric exam: Present: normal affect, normal mood





- Skin


Skin exam: Present: warm, dry, intact, normal color.  Absent: rash





ED Course


 Vital Signs











  18





  02:39 08:40 08:49


 


Temperature 98.3 F  101 F H


 


Pulse Rate 82  91 H


 


Respiratory 18 18 18





Rate   


 


Blood Pressure 127/76  


 


Blood Pressure   122/81





[Right]   


 


O2 Sat by Pulse 100 99 99





Oximetry   














  18





  09:20 10:20


 


Temperature  


 


Pulse Rate  


 


Respiratory 18 18





Rate  


 


Blood Pressure  


 


Blood Pressure  





[Right]  


 


O2 Sat by Pulse  





Oximetry  














ED Medical Decision Making





- Lab Data


Result diagrams: 


 18 02:58





 18 02:58





- Medical Decision Making


A/P: Lower extremity swelling


1-CBC BMP BUN and creatinine and CK unremarkable


2-patient had positive d-dimer will ordered lower extremity bilateral duplex to 

rule out DVT.  I we'll treat empirically with 1 dose of Lovenox for now after 

my discussion with ED attending Dr. Whitfield regarding this clinical scenario


3-I informed patient of my current clinical concerns and findings and advised 

her that I would be treating her empirically for potential DVT in lower 

extremities given her calf discomfort and lower extremity swelling and will 

obtain a lower extremity doppler study to definitively rule out DVT


4- signed out case to my colleague nurse practitioner Ms. Corcoran to follow-up 

vascular study


Critical care attestation.: 


If time is entered above; I have spent that time in minutes in the direct care 

of this critically ill patient, excluding procedure time.








ED Disposition


Clinical Impression: 


 Swelling of extremity of unknown etiology, Fever in adult, Elevated liver 

enzymes





Disposition:  TO HOME OR SELFCARE


Condition: Stable


Instructions:  Fever in Adults (ED), Leg Edema (ED), DASH Eating Plan (ED), Low 

Sodium Diet (ED)


Additional Instructions: 


Please follow up with a primary care physician in 3 days regarding and swelling 

to legs.


Follow-up with cardiologist regarding bilateral lower leg swelling.


Take medication as prescribed





Prescriptions: 


Acetaminophen [Tylenol] 500 mg PO Q6HR PRN #12 tablet


 PRN Reason: fever


Furosemide [Lasix] 20 mg PO BID 3 Days #6 tablet


Potassium Chloride [K-Dur] 20 meq PO BID 3 Days #6 tab


Referrals: 


PRIMARY CARE,MD [Primary Care Provider] - 18


CHRISTINA JIMENEZ MD [Staff Physician] - 18


[facility] [Outside] - 18


Forms:  Work/School Release Form(ED)





<JUSTEN CORCORAN - Last Filed: 18 11:04>





ED Course





- Reevaluation(s)


Reevaluation #1: 





18 08:50


Patient stable in no acute distress.  Doppler studies negative for SVT or DVT.  

Temperature is 101 and Tylenol 975 mg given and we will reevaluate








Reevaluation #2: 





18 09:17


Patient evaluated and I gave her results of Doppler studies and also told her 

that I will be ordering a CT scan with IV contrast for her chest.  She is 

stable denies any shortness of breath.  Lung sounds are clear.  Patient to be 

started on IV fluid normal saline and she was given Tylenol 975 mg by mouth.  We

'll reevaluate.


Reevaluation #3: 





18 11:04


Patient will find the stating she is afebrile.  CTA negative and this was 

explained to patient.  Discharged home in stable condition





ED Medical Decision Making





- Lab Data


Result diagrams: 


 18 02:58





 18 02:58





- Radiology Data


Radiology results: report reviewed





VASCULAR LAB.PRELIMINARY REPORT. BLE VENOUS DUPLEX DONE. NO EVIDENCE OF DVT/SVT 

IN VESSELS VISUALIZED.


Initialized on 18 08:38 - END OF NOTE





CTA chest reveals unremarkable CT scan





Patient: YOANA GOSS MR#: [id number] 


: 1985 Acct:[id number] 


Age/Sex: 32 / F ADM Date: 18 


Loc: ED 


Attending Dr: Nahid














cc: DANNY MAYNARD 








CTA CHEST: 





HISTORY: Swelling to legs, elevated d-dimer. 





COMPARISON: none. 





TECHNIQUE: Helical CT in 1.25mm intervals following IV contrast. 


Pulmonary embolus protocol. Sagittal and coronal reformatted images. 


Rotational MIP images. 





FINDINGS: 








Contrast bolus is satisfactory. No pulmonary embolus is identified. 





Thyroid gland: Normal. 





Tracheobronchial tree: Normal. 





Esophagus: Normal. 





Heart: Normal. 





Pericardium: Normal. 





Mediastinum: Normal. 





Lung Fields: normal. 





Pleural Spaces: Normal. 





Musculoskeletal: Normal. 








IMPRESSION: 


No evidence for pulmonary embolus. 


Unremarkable CT chest with contrast. 





Transcribed By: TTR 


Dictated By: LUIS HERNÁNDEZ JR, MD 


Electronically Authenticated By: LUIS HERNÁNDEZ JR, MD 


Signed Date/Time: 18 











DD/DT: 18 


TD/TT: 18





ED Disposition


Is pt being admited?: No


Does the pt Need Aspirin: No

## 2018-06-08 NOTE — CAT SCAN REPORT
CTA CHEST:



HISTORY: Swelling to legs, elevated d-dimer.



COMPARISON: none.



TECHNIQUE: Helical CT in 1.25mm intervals following IV contrast.  

Pulmonary embolus protocol.  Sagittal and coronal reformatted images.  

Rotational MIP images.



FINDINGS:





Contrast bolus is satisfactory.  No pulmonary embolus is identified.



Thyroid gland: Normal.



Tracheobronchial tree: Normal.



Esophagus: Normal.



Heart: Normal.



Pericardium: Normal.



Mediastinum: Normal.



Lung Fields: normal.



Pleural Spaces: Normal.



Musculoskeletal: Normal.





IMPRESSION: 

No evidence for pulmonary embolus.  

Unremarkable CT chest with contrast.

## 2019-09-16 ENCOUNTER — HOSPITAL ENCOUNTER (EMERGENCY)
Dept: HOSPITAL 5 - ED | Age: 34
Discharge: HOME | End: 2019-09-16
Payer: MEDICAID

## 2019-09-16 DIAGNOSIS — Z79.899: ICD-10-CM

## 2019-09-16 DIAGNOSIS — O20.8: Primary | ICD-10-CM

## 2019-09-16 DIAGNOSIS — Z88.6: ICD-10-CM

## 2019-09-16 DIAGNOSIS — Z88.8: ICD-10-CM

## 2019-09-16 DIAGNOSIS — Z3A.08: ICD-10-CM

## 2019-09-16 DIAGNOSIS — O26.891: ICD-10-CM

## 2019-09-16 DIAGNOSIS — R10.2: ICD-10-CM

## 2019-09-16 LAB
BASOPHILS # (AUTO): 0.1 K/MM3 (ref 0–0.1)
BASOPHILS NFR BLD AUTO: 0.8 % (ref 0–1.8)
BILIRUB UR QL STRIP: (no result)
BLOOD UR QL VISUAL: (no result)
EOSINOPHIL # BLD AUTO: 0.1 K/MM3 (ref 0–0.4)
EOSINOPHIL NFR BLD AUTO: 1.1 % (ref 0–4.3)
HCT VFR BLD CALC: 37.9 % (ref 30.3–42.9)
HGB BLD-MCNC: 12.4 GM/DL (ref 10.1–14.3)
LYMPHOCYTES # BLD AUTO: 3.3 K/MM3 (ref 1.2–5.4)
LYMPHOCYTES NFR BLD AUTO: 41.4 % (ref 13.4–35)
MCHC RBC AUTO-ENTMCNC: 33 % (ref 30–34)
MCV RBC AUTO: 83 FL (ref 79–97)
MONOCYTES # (AUTO): 0.7 K/MM3 (ref 0–0.8)
MONOCYTES % (AUTO): 8.8 % (ref 0–7.3)
MUCOUS THREADS #/AREA URNS HPF: (no result) /HPF
PH UR STRIP: 5 [PH] (ref 5–7)
PLATELET # BLD: 300 K/MM3 (ref 140–440)
PROT UR STRIP-MCNC: (no result) MG/DL
RBC # BLD AUTO: 4.57 M/MM3 (ref 3.65–5.03)
RBC #/AREA URNS HPF: 3 /HPF (ref 0–6)
UROBILINOGEN UR-MCNC: 2 MG/DL (ref ?–2)
WBC #/AREA URNS HPF: 3 /HPF (ref 0–6)

## 2019-09-16 PROCEDURE — 81001 URINALYSIS AUTO W/SCOPE: CPT

## 2019-09-16 PROCEDURE — 76817 TRANSVAGINAL US OBSTETRIC: CPT

## 2019-09-16 PROCEDURE — 36415 COLL VENOUS BLD VENIPUNCTURE: CPT

## 2019-09-16 PROCEDURE — 76801 OB US < 14 WKS SINGLE FETUS: CPT

## 2019-09-16 PROCEDURE — 85025 COMPLETE CBC W/AUTO DIFF WBC: CPT

## 2019-09-16 PROCEDURE — 84702 CHORIONIC GONADOTROPIN TEST: CPT

## 2019-09-16 NOTE — EMERGENCY DEPARTMENT REPORT
ED Female  HPI





- General


Chief complaint: Vaginal Bleeding


Stated complaint: 8 WKS PREG BLEEDING AND CRAMPING


Time Seen by Provider: 19 20:19


Source: patient


Mode of arrival: Ambulatory


Limitations: No Limitations





- History of Present Illness


Initial comments: 





This is a 34-year-old  who presents to ED complaining of vaginal bleeding

and pelvic pain just started today around 9 AM patient has not seen OB/GYN for 

this pregnancy.  Patient denies any trauma or injuries.  She denies dysuria, 

vaginal discharge.


Patient also states that she is some boils on her armpit, stating she's had this

before and had to take antibiotics.


MD Complaint: vaginal bleeding, pelvic pain





- Related Data


                                  Previous Rx's











 Medication  Instructions  Recorded  Last Taken  Type


 


Ondansetron [Zofran] 4 mg PO Q6HR PRN #20 tablet 14 1 Week Ago Rx





   ~18 


 


Methylergonovine [Methergine] 0.2 mg PO Q8HR #6 tablet 18 Unknown Rx


 


oxyCODONE /ACETAMINOPHEN [Percocet 1 tab PO Q6HR PRN #20 tablet 18 Unknown

 Rx





5/325 mg]    


 


Furosemide [Lasix] 20 mg PO BID 3 Days #6 tablet 18 Unknown Rx


 


Potassium Chloride [K-Dur] 20 meq PO BID 3 Days #6 tab 18 Unknown Rx


 


Acetaminophen [Acetaminophen TAB] 500 mg PO Q6HR PRN #20 tablet 19 Unknown

Rx


 


Amoxicillin [Amoxicillin TAB] 875 mg PO BID #20 tablet 19 Unknown Rx











                                    Allergies











Allergy/AdvReac Type Severity Reaction Status Date / Time


 


ibuprofen [From Motrin] AdvReac  Headache Verified 11/23/15 11:25


 


metoclopramide [From Reglan] AdvReac  Dizziness Verified 18 10:37














ED Review of Systems


ROS: 


Stated complaint: 8 WKS PREG BLEEDING AND CRAMPING


Other details as noted in HPI





Comment: All other systems reviewed and negative





ED Past Medical Hx





- Past Medical History


Previous Medical History?: No


Hx Hypertension: No


Additional medical history:  A0





- Surgical History


Past Surgical History?: Yes


Additional Surgical History: abortions.  cyst on leg removed





- Social History


Smoking Status: Never Smoker


Substance Use Type: None





- Medications


Home Medications: 


                                Home Medications











 Medication  Instructions  Recorded  Confirmed  Last Taken  Type


 


Ondansetron [Zofran] 4 mg PO Q6HR PRN #20 tablet 14 1 Week Ago Rx





    ~18 


 


Methylergonovine [Methergine] 0.2 mg PO Q8HR #6 tablet 18  Unknown Rx


 


oxyCODONE /ACETAMINOPHEN [Percocet 1 tab PO Q6HR PRN #20 tablet 18  

Unknown Rx





5/325 mg]     


 


Furosemide [Lasix] 20 mg PO BID 3 Days #6 tablet 18  Unknown Rx


 


Potassium Chloride [K-Dur] 20 meq PO BID 3 Days #6 tab 18  Unknown Rx


 


Acetaminophen [Acetaminophen TAB] 500 mg PO Q6HR PRN #20 tablet 19  

Unknown Rx


 


Amoxicillin [Amoxicillin TAB] 875 mg PO BID #20 tablet 19  Unknown Rx














ED Physical Exam





- General


Limitations: No Limitations


General appearance: alert, in no apparent distress





- Head


Head exam: Present: atraumatic, normocephalic





- Eye


Eye exam: Present: normal appearance





- ENT


ENT exam: Present: mucous membranes moist





- Neck


Neck exam: Present: normal inspection





- Respiratory


Respiratory exam: Present: normal lung sounds bilaterally.  Absent: respiratory 

distress





- Cardiovascular


Cardiovascular Exam: Present: regular rate, normal rhythm.  Absent: systolic 

murmur, diastolic murmur, rubs, gallop





- GI/Abdominal


GI/Abdominal exam: Present: soft, normal bowel sounds





- Extremities Exam


Extremities exam: Present: normal inspection, full ROM, other (small boil noted 

to right arm.  No discharge, no redness no swelling)





- Back Exam


Back exam: Present: normal inspection





- Neurological Exam


Neurological exam: Present: alert, oriented X3





- Psychiatric


Psychiatric exam: Present: normal affect, normal mood





- Skin


Skin exam: Present: warm, dry, intact, normal color.  Absent: rash





ED Course


                                   Vital Signs











  19





  20:19


 


Temperature 98.2 F


 


Pulse Rate 108 H


 


Respiratory 18





Rate 


 


Blood Pressure 136/89


 


O2 Sat by Pulse 97





Oximetry 














ED Medical Decision Making





- Lab Data


Result diagrams: 


                                 19 20:32








                             Laboratory Last Values











WBC  7.9 K/mm3 (4.5-11.0)   19  20:32    


 


RBC  4.57 M/mm3 (3.65-5.03)   19  20:32    


 


Hgb  12.4 gm/dl (10.1-14.3)   19  20:32    


 


Hct  37.9 % (30.3-42.9)   19  20:    


 


MCV  83 fl (79-97)   19  20:32    


 


MCH  27 pg (28-32)  L  19  20:    


 


MCHC  33 % (30-34)   19:    


 


RDW  14.8 % (13.2-15.2)   19  20:32    


 


Plt Count  300 K/mm3 (140-440)   19  20:32    


 


Lymph % (Auto)  41.4 % (13.4-35.0)  H  19  20:32    


 


Mono % (Auto)  8.8 % (0.0-7.3)  H  19  20:32    


 


Eos % (Auto)  1.1 % (0.0-4.3)   19  20:    


 


Baso % (Auto)  0.8 % (0.0-1.8)   19:    


 


Lymph #  3.3 K/mm3 (1.2-5.4)   19  20:    


 


Mono #  0.7 K/mm3 (0.0-0.8)   19  20:    


 


Eos #  0.1 K/mm3 (0.0-0.4)   19  20:    


 


Baso #  0.1 K/mm3 (0.0-0.1)   19  20:    


 


Seg Neutrophils %  47.9 % (40.0-70.0)   19  20:    


 


Seg Neutrophils #  3.8 K/mm3 (1.8-7.7)   19  20:32    


 


HCG, Quant  1900 mIU/mL (0-4)  H  19  20:32    


 


  Yellow  (Yellow)   19  20:15    


 


  Slightly-cloudy  (Clear)   19  20:15    


 


  5.0  (5.0-7.0)   19  20:15    


 


Ur Specific Gravity  1.026  (1.003-1.030)   19  20:15    


 


  <15 mg/dl mg/dL (Negative)   19  20:15    


 


  Neg mg/dL (Negative)   19  20:15    


 


  Neg mg/dL (Negative)   19  20:15    


 


  Lg  (Negative)   19  20:15    


 


  Neg  (Negative)   19  20:15    


 


  Neg  (Negative)   19  20:15    


 


  2.0 mg/dL (<2.0)   19  20:15    


 


Ur Leukocyte Esterase  Tr  (Negative)   19  20:15    


 


  3.0 /HPF (0.0-6.0)   19  20:15    


 


  3.0 /HPF (0.0-6.0)   19  20:15    


 


U Epithel Cells (Auto)  6.0 /HPF (0-13.0)   19  20:15    


 


  3+ /HPF  19  20:15    

















- Radiology Data


Radiology results: report reviewed, image reviewed


Transabdominal and transvaginal OB pelvic ultrasound





INDICATION / CLINICAL INFORMATION:


Vaginal bleeding..





COMPARISON:


None available.





FINDINGS:


TRANSABDOMINAL: The uterus measures 10.3 x 4.9 x 4.7 cm. The endometrial cavity 

is suboptimally


imaged. Neither ovary is seen. There is no evidence of abnormal mass or fluid 

collection.





TRANSVAGINAL: There are 2 small adjacent rounded to ovoid fluid collections in 

the endometrial


cavity. The larger collection measures 6 weeks 3 days and the smaller collection

5 weeks 3 days. I


do not identify a yolk sac or fetal pole in either sac. There is a small 

adjacent area of


subchorionic hemorrhage measuring 1.5 cm.





There is a 1.3 cm fibroid in the uterine body on the left. The right ovary 

measures 3.5 x 1.7 x 2.5


cm and contains a 1.8 cm corpus luteal cyst. The left ovary measures 2.9 x 1.3 x

1.5 cm. I see no


evidence of an extraovarian mass or free fluid.





IMPRESSION:


1. 2 small adjacent saclike structures in the endometrial cavity without 

evidence of a fetal pole


or yolk sac. Small adjacent area of subchorionic hemorrhage. A follow-up pelvic 

ultrasound in 7-10


days may be helpful in further evaluation.


2. Small corpus luteal cyst in the right ovary. I see no evidence of ectopic 

pregnancy.


3. Small uterine fibroid.





Signer Name: Brett Greene MD


Signed: 2019 10:06 PM


Workstation Name: [id number]








Transcribed By: RT


Dictated By: Brett Greene MD


Electronically Authenticated By: Brett Greene MD


Signed Date/Time: 19











DD/DT: 19








- Medical Decision Making





34-year-old female presents to ED with threatened 


ED course: Pt received ultra sound, CBC, urinalysis, pregnancy test and 

quantitative ED


All labs within normal limits,


Patient is followed by an OB/GYN after Luis Haynes but has not had an 

appointment yet this pregnancy


Ultrasound report shows gestational sac with small subchorionic hemorrhage no 

signs of ectopic pregnancy, see reported above


Vital signs normalized patient is in no acute distress.


I discussed with the patient to follow-up with her OB/GYN Dr. Haynes's office in 

2-3 days for repeat and a repeat ultrasound in 7-10 days


I discussed all labs and ultrasound findings with the patient.


I discussed with the patient that he if bleeding worsens or new symptoms develop

to return to ED immediately








Critical care attestation.: 


If time is entered above; I have spent that time in minutes in the direct care 

of this critically ill patient, excluding procedure time.








ED Disposition


Clinical Impression: 


 Vaginal bleeding during pregnancy





Disposition: DC-01 TO HOME OR SELFCARE


Is pt being admited?: No


Does the pt Need Aspirin: No


Condition: Stable


Instructions:  Threatened Miscarriage (ED), Pregnancy (ED), Uterine Fibroids 

(ED)


Additional Instructions: 


Make sure to follow up with the OB/GYN as discussed.


Take all your medications as you've been prescribed.


If you have any worsening symptoms or develop new symptoms please return to ED 

immediately.


Prescriptions: 


Acetaminophen [Acetaminophen TAB] 500 mg PO Q6HR PRN #20 tablet


 PRN Reason: fever


Amoxicillin [Amoxicillin TAB] 875 mg PO BID #20 tablet


Referrals: 


LUIS HAYNES MD [Primary Care Provider] - 3-5 Days


Forms:  Work/School Release Form(ED)


Time of Disposition: 23:06

## 2019-09-16 NOTE — ULTRASOUND REPORT
Transabdominal and transvaginal OB pelvic ultrasound



INDICATION / CLINICAL INFORMATION:

Vaginal bleeding..



COMPARISON:

None available.



FINDINGS:

TRANSABDOMINAL: The uterus measures 10.3 x 4.9 x 4.7 cm. The endometrial cavity is suboptimally image
d. Neither ovary is seen. There is no evidence of abnormal mass or fluid collection.



TRANSVAGINAL: There are 2 small adjacent rounded to ovoid fluid collections in the endometrial cavity
. The larger collection measures 6 weeks 3 days and the smaller collection 5 weeks 3 days. I do not i
dentify a yolk sac or fetal pole in either sac. There is a small adjacent area of subchorionic hemorr
jennifer measuring 1.5 cm.



There is a 1.3 cm fibroid in the uterine body on the left. The right ovary measures 3.5 x 1.7 x 2.5 c
m and contains a 1.8 cm corpus luteal cyst. The left ovary measures 2.9 x 1.3 x 1.5 cm. I see no evid
ence of an extraovarian mass or free fluid.



IMPRESSION:

1. 2 small adjacent saclike structures in the endometrial cavity without evidence of a fetal pole or 
yolk sac. Small adjacent area of subchorionic hemorrhage. A follow-up pelvic ultrasound in 7-10 days 
may be helpful in further evaluation.

2. Small corpus luteal cyst in the right ovary. I see no evidence of ectopic pregnancy.

3. Small uterine fibroid.



Signer Name: Brett Greene MD 

Signed: 9/16/2019 10:06 PM

 Workstation Name: [id number]

## 2019-09-16 NOTE — EVENT NOTE
ED Screening Note


ED Screening Note: 





LNMP: july 5th


began having cramping and vaginal bleeding today


G:3/P:1/A:1


has not seen ob/gyn, went to pregnancy clinic








This initial assessment/diagnostic orders/clinical plan/treatment(s) is/are 

subject to change based on patients health status, clinical progression and re-

assessment by fellow clinical providers in the ED. Further treatment and workup 

at subsequent clinical providers discretion. Patient/guardian urged not to elope

from the ED as their condition may be serious if not clinically assessed and 

managed. 





Initial orders include: labs, UA, ob US

## 2019-09-16 NOTE — ULTRASOUND REPORT
Transabdominal and transvaginal OB pelvic ultrasound



INDICATION / CLINICAL INFORMATION:

Vaginal bleeding..



COMPARISON:

None available.



FINDINGS:

TRANSABDOMINAL: The uterus measures 10.3 x 4.9 x 4.7 cm. The endometrial cavity is suboptimally image
d. Neither ovary is seen. There is no evidence of abnormal mass or fluid collection.



TRANSVAGINAL: There are 2 small adjacent rounded to ovoid fluid collections in the endometrial cavity
. The larger collection measures 6 weeks 3 days and the smaller collection 5 weeks 3 days. I do not i
dentify a yolk sac or fetal pole in either sac. There is a small adjacent area of subchorionic hemorr
jennifer measuring 1.5 cm.



There is a 1.3 cm fibroid in the uterine body on the left. The right ovary measures 3.5 x 1.7 x 2.5 c
m and contains a 1.8 cm corpus luteal cyst. The left ovary measures 2.9 x 1.3 x 1.5 cm. I see no evid
ence of an extraovarian mass or free fluid.



IMPRESSION:

1. 2 small adjacent saclike structures in the endometrial cavity without evidence of a fetal pole or 
yolk sac. Small adjacent area of subchorionic hemorrhage. A follow-up pelvic ultrasound in 7-10 days 
may be helpful in further evaluation.

2. Small corpus luteal cyst in the right ovary. I see no evidence of ectopic pregnancy.

3. Small uterine fibroid.



Signer Name: Brett Greene MD 

Signed: 9/16/2019 10:06 PM

 Workstation Name: [email or web address]

## 2019-09-17 VITALS — DIASTOLIC BLOOD PRESSURE: 84 MMHG | SYSTOLIC BLOOD PRESSURE: 140 MMHG

## 2023-12-04 ENCOUNTER — APPOINTMENT (RX ONLY)
Dept: URBAN - METROPOLITAN AREA CLINIC 159 | Facility: CLINIC | Age: 38
Setting detail: DERMATOLOGY
End: 2023-12-04

## 2023-12-04 DIAGNOSIS — L72.0 EPIDERMAL CYST: ICD-10-CM | Status: WORSENING

## 2023-12-04 DIAGNOSIS — L71.8 OTHER ROSACEA: ICD-10-CM | Status: STABLE

## 2023-12-04 PROCEDURE — ? PRESCRIPTION MEDICATION MANAGEMENT

## 2023-12-04 PROCEDURE — ? DIAGNOSIS COMMENT

## 2023-12-04 PROCEDURE — ? CONSULTATION EXCISION

## 2023-12-04 PROCEDURE — ? PRESCRIPTION

## 2023-12-04 PROCEDURE — ? COUNSELING

## 2023-12-04 PROCEDURE — 99214 OFFICE O/P EST MOD 30 MIN: CPT

## 2023-12-04 RX ORDER — METRONIDAZOLE 10 MG/G
GEL TOPICAL
Qty: 60 | Refills: 11 | Status: ERX | COMMUNITY
Start: 2023-12-04

## 2023-12-04 RX ADMIN — METRONIDAZOLE: 10 GEL TOPICAL at 00:00

## 2023-12-04 ASSESSMENT — LOCATION SIMPLE DESCRIPTION DERM
LOCATION SIMPLE: LEFT SUPERIOR EYELID
LOCATION SIMPLE: LEFT CHEEK

## 2023-12-04 ASSESSMENT — LOCATION DETAILED DESCRIPTION DERM
LOCATION DETAILED: LEFT LATERAL SUPERIOR EYELID
LOCATION DETAILED: LEFT INFERIOR CENTRAL MALAR CHEEK

## 2023-12-04 ASSESSMENT — LOCATION ZONE DERM
LOCATION ZONE: EYELID
LOCATION ZONE: FACE

## 2023-12-04 NOTE — PROCEDURE: CONSULTATION EXCISION
Anatomic Location From Referring Provider: left superior eyelid
X Size Of Lesion In Cm (Optional): 0.7
Detail Level: Simple
Size Of Lesion: 1.1

## 2023-12-04 NOTE — HPI: BODY LOCATION - FACE
How Severe Is Your Condition?: moderate
Additional History: Patient took doxycycline 100mg qd x 1 mo the 50mg daily x 2 months. Was also given metronidazole to apply to face bid.

## 2023-12-04 NOTE — PROCEDURE: DIAGNOSIS COMMENT
Detail Level: Detailed
Render Risk Assessment In Note?: no
Comment: Attempted to treat with doxycycline for the possibility of chalazion, did not respond, will excise at this point as it continues to be bothersome

## 2023-12-05 ENCOUNTER — APPOINTMENT (RX ONLY)
Dept: URBAN - METROPOLITAN AREA CLINIC 162 | Facility: CLINIC | Age: 38
Setting detail: DERMATOLOGY
End: 2023-12-05

## 2023-12-19 ENCOUNTER — APPOINTMENT (RX ONLY)
Dept: URBAN - METROPOLITAN AREA CLINIC 162 | Facility: CLINIC | Age: 38
Setting detail: DERMATOLOGY
End: 2023-12-19

## 2023-12-19 ENCOUNTER — RX ONLY (OUTPATIENT)
Age: 38
Setting detail: RX ONLY
End: 2023-12-19

## 2023-12-19 DIAGNOSIS — L72.8 OTHER FOLLICULAR CYSTS OF THE SKIN AND SUBCUTANEOUS TISSUE: ICD-10-CM

## 2023-12-19 PROCEDURE — 12051 INTMD RPR FACE/MM 2.5 CM/<: CPT

## 2023-12-19 PROCEDURE — ? EXCISION

## 2023-12-19 PROCEDURE — 11442 EXC FACE-MM B9+MARG 1.1-2 CM: CPT

## 2023-12-19 RX ORDER — HYDROCODONE BITARTRATE AND ACETAMINOPHEN 5; 325 MG/1; MG/1
TABLET ORAL
Qty: 12 | Refills: 0 | COMMUNITY
Start: 2023-12-19

## 2023-12-19 ASSESSMENT — LOCATION ZONE DERM: LOCATION ZONE: EYELID

## 2023-12-19 ASSESSMENT — LOCATION DETAILED DESCRIPTION DERM: LOCATION DETAILED: LEFT LATERAL SUPERIOR EYELID

## 2023-12-19 ASSESSMENT — LOCATION SIMPLE DESCRIPTION DERM: LOCATION SIMPLE: LEFT SUPERIOR EYELID

## 2023-12-19 NOTE — PROCEDURE: EXCISION

## 2023-12-27 ENCOUNTER — APPOINTMENT (RX ONLY)
Dept: URBAN - METROPOLITAN AREA CLINIC 162 | Facility: CLINIC | Age: 38
Setting detail: DERMATOLOGY
End: 2023-12-27

## 2023-12-27 DIAGNOSIS — Z48.02 ENCOUNTER FOR REMOVAL OF SUTURES: ICD-10-CM

## 2023-12-27 PROCEDURE — ? SUTURE REMOVAL (GLOBAL PERIOD)

## 2023-12-27 ASSESSMENT — LOCATION ZONE DERM: LOCATION ZONE: FACE

## 2023-12-27 ASSESSMENT — LOCATION SIMPLE DESCRIPTION DERM: LOCATION SIMPLE: LEFT EYEBROW

## 2023-12-27 ASSESSMENT — LOCATION DETAILED DESCRIPTION DERM: LOCATION DETAILED: LEFT LATERAL EYEBROW

## 2023-12-27 NOTE — PROCEDURE: SUTURE REMOVAL (GLOBAL PERIOD)
Body Location Override (Optional - Billing Will Still Be Based On Selected Body Map Location If Applicable): left lateral superior eyelid
Detail Level: Detailed
Add 21095 Cpt? (Important Note: In 2017 The Use Of 07444 Is Being Tracked By Cms To Determine Future Global Period Reimbursement For Global Periods): no